# Patient Record
Sex: MALE | Race: WHITE | Employment: OTHER | URBAN - METROPOLITAN AREA
[De-identification: names, ages, dates, MRNs, and addresses within clinical notes are randomized per-mention and may not be internally consistent; named-entity substitution may affect disease eponyms.]

---

## 2020-06-09 ENCOUNTER — HOSPITAL ENCOUNTER (OUTPATIENT)
Dept: PREADMISSION TESTING | Age: 70
Discharge: HOME | End: 2020-06-09
Payer: MEDICARE

## 2020-06-09 PROCEDURE — 87635 SARS-COV-2 COVID-19 AMP PRB: CPT

## 2020-06-10 LAB — SARS-COV-2, COV2NT: NOT DETECTED

## 2020-06-11 RX ORDER — SODIUM CHLORIDE 0.9 % (FLUSH) 0.9 %
5-40 SYRINGE (ML) INJECTION AS NEEDED
Status: CANCELLED | OUTPATIENT
Start: 2020-06-11

## 2020-06-11 RX ORDER — EPINEPHRINE 0.1 MG/ML
1 INJECTION INTRACARDIAC; INTRAVENOUS
Status: CANCELLED | OUTPATIENT
Start: 2020-06-11 | End: 2020-06-12

## 2020-06-11 RX ORDER — ATROPINE SULFATE 0.1 MG/ML
0.5 INJECTION INTRAVENOUS
Status: CANCELLED | OUTPATIENT
Start: 2020-06-11 | End: 2020-06-12

## 2020-06-11 RX ORDER — DIPHENHYDRAMINE HYDROCHLORIDE 50 MG/ML
50 INJECTION, SOLUTION INTRAMUSCULAR; INTRAVENOUS ONCE
Status: CANCELLED | OUTPATIENT
Start: 2020-06-11 | End: 2020-06-11

## 2020-06-11 RX ORDER — SODIUM CHLORIDE 0.9 % (FLUSH) 0.9 %
5-40 SYRINGE (ML) INJECTION EVERY 8 HOURS
Status: CANCELLED | OUTPATIENT
Start: 2020-06-11

## 2020-06-11 RX ORDER — DEXTROMETHORPHAN/PSEUDOEPHED 2.5-7.5/.8
1.2 DROPS ORAL
Status: CANCELLED | OUTPATIENT
Start: 2020-06-11

## 2020-06-12 NOTE — H&P
Assessment/Plan  # Detail Type Description    1. Assessment Personal history of colonic polyps (Z86.010). Patient Plan 69 yr old male patient of Dr. Liz Herrera seen for hx of polyps. Last colonoscopy was done 9/5/14 done by Dr. Yasmany Werner. Normal cecum, a single small sessile sigmoid polyp. Path revealed adenomatous polyp. N fx hx of colon cancer. he hs one bm daily. he weighs only 113 lbs. so we need to use the pediatric colonoscope. he used to take narcotic medications long time ago but no longer. so he may have a problem with sedation? ? as the last time he was done under [facility]. he has HTN . Had appendectomy and back surgery. he had an unbalanced tabetic gait. I explained the procedure of colonoscopy, where there is a chance of taking a biopsy, injecting, dilating or removing polyps. I explained also the risk and benefits involved which include but not limited to reaction to medication/ sedation, bleeding, perforation, where there may be a need for surgery. There is also a chance of missing a lesion or a polyp if the bowel prep is inadequate or the colon is unusually tortuous and difficult. I answered his questions. He was agreeable to proceed with the test. I gave him the Suprep to clean the bowels. He may have to take extra laxatives the days before to assure that the bowel prep is as perfect as possible. This 69year old male presents for Hx polyp/colon cancer. History of Present Illness:  1. Hx polyp/colon cancer   Prior screening:  colonoscopy. Denies risk factors. Pertinent negatives include abdominal pain, change in bowel habits, change in stool caliber, constipation, decreased appetite, diarrhea, melena, nausea, rectal bleeding, vomiting, weight gain and weight loss. Additional information: No family history of colon cancer, No family history of Crohn's/colitis and NSAID/ASA use. PROBLEM LIST:   Problem List reviewed.    Problem Description Onset Date Chronic Clinical Status Notes   On examination - BP reading very high 2012 Y     Generalized anxiety disorder 2012 Y     Pure hypercholesterolemia 2012 Y     Tobacco user 2012 Y               PAST MEDICAL/SURGICAL HISTORY   (Detailed)    Disease/disorder Onset Date Management Date Comments     Back surgery 2012      Appendectomy 2002    Cerebrovascular accident       Hyperlipidemia       Hypertension       Anxiety             Family History  (Detailed)  Relationship Family Member Name  Age at Death Condition Onset Age Cause of Death   Father  N  aneurysm  N   Mother  N  Heart disease  N   Mother  N  Diabetes mellitus  N   Mother  N  Hypertension  N         Social History:  (Detailed)  Tobacco use reviewed. The patient is right-handed. Preferred language is [place]. EDUCATION/EMPLOYMENT/OCCUPATION  Employment History Status Retired Restrictions   [employer] Nurse retired 1999      MARITAL STATUS/FAMILY/SOCIAL SUPPORT  Marital status: Single   Tobacco use status: Cigarette smoker. Smoking status: Current every day smoker. TOBACCO SCREENING:  Patient has used tobacco.     SMOKING STATUS  Type Smoking Status Usage Per Day Years Used Pack Years Total Pack Years   Cigarette Current every day smoker       Smokeless Current every day smoker         TOBACCO CESSATION INFORMATION  Date Counseled By Order Status Description Code Tobacco Cessation Information   2012 Rolly Persaud Tobacco cessation counseling completed   Tobacco cessation counseling   10/11/2012 Rolly Persaud Tobacco cessation counseling completed   Tobacco cessation counseling   10/17/2013 Adis Nieto Tobacco cessation counseling completed   Tobacco cessation counseling     ALCOHOL  There is a history of alcohol use. Type: Beer. 2 beers consumed daily. Last alcoholic drink was last night. CAFFEINE  The patient uses caffeine: coffee and soda. - 1 cup a day. LIFESTYLE  Exercises 2-3 times a week.   Exercises 3 hours per week.    Church/SPIRITUAL  Patient agrees to transfusion. Medications (active prior to today)  Medication Name Sig Description Start Date Stop Date Refilled Rx Elsewhere   polyethylene glycol 3350 17 gram/dose oral powder Take 255 grams per oral intake per colonoscopy prep sheet 08/28/2014 06/02/2020  N   cyanocobalamin (vit B-12) 1,000 mcg/mL injection solution 1 ml injected IM weekly for 4 one month then one time monthly therafter. 11/01/2016 06/02/2020  N   Crestor 10 mg tablet take 1 tablet by oral route  every day 11/07/2019 11/07/2019 N   lisinopril 20 mg-hydrochlorothiazide 12.5 mg tablet take 1 tablet by oral route  every day 11/07/2019 11/07/2019 N   amlodipine 10 mg tablet take 1 tablet by oral route  every day 11/07/2019 11/07/2019 N   ergocalciferol (vitamin D2) 50,000 unit capsule take 1 capsule by oral route  every week 11/07/2019 06/02/2020 11/07/2019 N   aspirin 81 mg tablet,delayed release take 1 tablet by oral route  every day 05/06/2020   N     Patient Status   Completed with information received for patient in a summary of care record. Medication Reconciliation  Medications reconciled today. Medication Reviewed  Adherence Medication Name Sig Desc Elsewhere Status   taking as directed polyethylene glycol 3350 17 gram/dose oral powder Take 255 grams per oral intake per colonoscopy prep sheet N Verified   taking as directed cyanocobalamin (vit B-12) 1,000 mcg/mL injection solution 1 ml injected IM weekly for 4 one month then one time monthly therafter.  N Verified   taking as directed Crestor 10 mg tablet take 1 tablet by oral route  every day N Verified   taking as directed lisinopril 20 mg-hydrochlorothiazide 12.5 mg tablet take 1 tablet by oral route  every day N Verified   taking as directed amlodipine 10 mg tablet take 1 tablet by oral route  every day N Verified   taking as directed ergocalciferol (vitamin D2) 50,000 unit capsule take 1 capsule by oral route  every week N Verified   taking as directed aspirin 81 mg tablet,delayed release take 1 tablet by oral route  every day N Verified     Medications (Added, Continued or Stopped today)  Start Date Medication Directions PRN Status PRN Reason Instruction Stop Date   11/07/2019 amlodipine 10 mg tablet take 1 tablet by oral route  every day N      05/06/2020 aspirin 81 mg tablet,delayed release take 1 tablet by oral route  every day N      11/07/2019 Crestor 10 mg tablet take 1 tablet by oral route  every day N      11/01/2016 cyanocobalamin (vit B-12) 1,000 mcg/mL injection solution 1 ml injected IM weekly for 4 one month then one time monthly therafter. N   06/02/2020 11/07/2019 ergocalciferol (vitamin D2) 50,000 unit capsule take 1 capsule by oral route  every week N   06/02/2020 11/07/2019 lisinopril 20 mg-hydrochlorothiazide 12.5 mg tablet take 1 tablet by oral route  every day N      08/28/2014 polyethylene glycol 3350 17 gram/dose oral powder Take 255 grams per oral intake per colonoscopy prep sheet N   06/02/2020 06/02/2020 Suprep Bowel Prep Kit 17.5 gram-3.13 gram-1.6 gram oral solution take first half the evening before the procedure and the second half the morning of the procedure N        Allergies:  Ingredient Reaction (Severity) Medication Name Comment   NO KNOWN ALLERGIES            Review of System  System Neg/Pos Details   Constitutional Negative Chills, Fever, Malaise, Weight gain and Weight loss. ENMT Negative Ear infections, Nasal congestion, Sinus Infection and Sore throat. Eyes Negative Double vision and Eye pain. Respiratory Negative Asthma, Chronic cough, Dyspnea, Pleuritic pain and Wheezing. Cardio Negative Chest pain, Edema and Irregular heartbeat/palpitations. GI Negative Abdominal pain, Change in bowel habits, Change in stool caliber, Constipation, Decreased appetite, Diarrhea, Dysphagia, Heartburn, Hematemesis, Hematochezia, Melena, Nausea, Rectal bleeding, Reflux and Vomiting.     Negative Dysuria, Hematuria, Urinary frequency, Urinary incontinence and Urinary retention. Endocrine Negative Cold intolerance, Gynecomastia, Heat intolerance and Increased thirst.   Neuro Negative Dizziness, Headache, Numbness, Tremors and Vertigo. Psych Negative Anxiety, Depression and Increased stress. Integumentary Negative Hives, Pruritus and Rash. MS Negative Back pain, Joint pain and Myalgia. Hema/Lymph Negative Easy bleeding, Easy bruising and Lymphadenopathy. Allergic/Immuno Negative Chemicals in work place, Contact allergy, Food allergies, Immunosuppression and Seasonal allergies. Reproductive Negative Penile discharge and Sexual dysfunction. Vital Signs   Height  Time ft in cm Last Measured Height Position   2:38 PM 5.0 4.00 162.56 08/28/2014 Standing     06/15/20 1312 -- 75 118/63 -- -- -- 0Abnormal  97 % -- -- -- --   06/15/20 1310 -- 77 118/63 81 -- -- 18 97 % Room air -- -- --   06/15/20 1309 -- 76 126/68 -- -- -- -- 97 % -- -- -- --   06/15/20 1209 97.4 °F (36.3 °C) 90 117/76 90 -- -- 16 99 % Room air -- -- 51 kg (112 lb 6.4 oz)         PHYSICAL EXAM:  Exam Findings Details   Constitutional * Nourishment - thin. Constitutional Normal No acute distress. Well developed. Eyes Normal General - Right: Normal, Left: Normal. Conjunctiva - Right: Normal, Left: Normal. Sclera - Right: Normal, Left: Normal. Cornea - Right: Normal, Left: Normal. Pupil - Right: Normal, Left: Normal.   Nose/Mouth/Throat Normal Lips/teeth/gums - Normal. Tongue - Normal. Buccal mucosa - Normal. Palate & uvula - Normal.   Neck Exam Normal Inspection - Normal. Palpation - Normal. Thyroid gland - Normal. Cervical lymph nodes - Normal.   Respiratory Normal Inspection - Normal. Auscultation - Normal. Percussion - Normal. Cough - Absent. Effort - Normal.   Cardiovascular Normal Heart rate - Regular rate. Heart sounds - Normal S1, Normal S2. Murmurs - None. Extremities - No edema.    Abdomen Normal Inspection - Normal. Appliance(s) - None. Abdominal muscles - Normal. Auscultation - Normal. Percussion - Normal. Anterior palpation - Normal, No guarding, No rebound. CVA tenderness - None. Umbilicus - Normal. Abdominal reflexes - Normal. No abdominal tenderness. No hepatic enlargement. No splenic enlargement. No hernia. No Ascites. No palpable mass. Priest's sign - Negative. Skin Normal Inspection - Normal.   Musculoskeletal Normal Hands - Left: Normal, Right: Normal.   Extremity Normal No cyanosis. No edema. Clubbing - Absent. Psychiatric Normal Orientation - Oriented to time, place, person & situation. Not anxious. Appropriate mood and affect. Behavior appropriate for age. Sufficient language. No memory loss. Neurological * Balance & gait - tabetic. Neurological Normal Level of consciousness - Normal. Orientation - Normal. Memory - Normal. Motor - Normal. Coordination - Normal. Fine motor skills - Normal. DTRs - Normal. Hand dominance - Right-handed.        No change in H&P

## 2020-06-15 ENCOUNTER — HOSPITAL ENCOUNTER (OUTPATIENT)
Age: 70
Setting detail: OUTPATIENT SURGERY
Discharge: HOME | End: 2020-06-15
Attending: INTERNAL MEDICINE | Admitting: INTERNAL MEDICINE
Payer: MEDICARE

## 2020-06-15 VITALS
OXYGEN SATURATION: 95 % | DIASTOLIC BLOOD PRESSURE: 74 MMHG | SYSTOLIC BLOOD PRESSURE: 112 MMHG | TEMPERATURE: 97.3 F | HEIGHT: 64 IN | WEIGHT: 112.4 LBS | HEART RATE: 70 BPM | RESPIRATION RATE: 16 BRPM | BODY MASS INDEX: 19.19 KG/M2

## 2020-06-15 PROCEDURE — 74011250636 HC RX REV CODE- 250/636: Performed by: INTERNAL MEDICINE

## 2020-06-15 PROCEDURE — G0500 MOD SEDAT ENDO SERVICE >5YRS: HCPCS | Performed by: INTERNAL MEDICINE

## 2020-06-15 PROCEDURE — 76040000008: Performed by: INTERNAL MEDICINE

## 2020-06-15 PROCEDURE — 77030040361 HC SLV COMPR DVT MDII -B: Performed by: INTERNAL MEDICINE

## 2020-06-15 RX ORDER — NALOXONE HYDROCHLORIDE 0.4 MG/ML
0.4 INJECTION, SOLUTION INTRAMUSCULAR; INTRAVENOUS; SUBCUTANEOUS
Status: DISCONTINUED | OUTPATIENT
Start: 2020-06-15 | End: 2020-06-15 | Stop reason: HOSPADM

## 2020-06-15 RX ORDER — FLUMAZENIL 0.1 MG/ML
0.2 INJECTION INTRAVENOUS
Status: DISCONTINUED | OUTPATIENT
Start: 2020-06-15 | End: 2020-06-15 | Stop reason: HOSPADM

## 2020-06-15 RX ORDER — FENTANYL CITRATE 50 UG/ML
100 INJECTION, SOLUTION INTRAMUSCULAR; INTRAVENOUS
Status: DISCONTINUED | OUTPATIENT
Start: 2020-06-15 | End: 2020-06-15 | Stop reason: HOSPADM

## 2020-06-15 RX ORDER — MIDAZOLAM HYDROCHLORIDE 1 MG/ML
.25-5 INJECTION, SOLUTION INTRAMUSCULAR; INTRAVENOUS
Status: DISCONTINUED | OUTPATIENT
Start: 2020-06-15 | End: 2020-06-15 | Stop reason: HOSPADM

## 2020-06-15 RX ORDER — SODIUM CHLORIDE 9 MG/ML
1000 INJECTION, SOLUTION INTRAVENOUS CONTINUOUS
Status: DISCONTINUED | OUTPATIENT
Start: 2020-06-15 | End: 2020-06-15 | Stop reason: HOSPADM

## 2020-06-15 RX ADMIN — SODIUM CHLORIDE 1000 ML: 900 INJECTION, SOLUTION INTRAVENOUS at 12:21

## 2020-06-15 NOTE — DISCHARGE INSTRUCTIONS
Estevan Ellison  [id number]  1950    COLON DISCHARGE INSTRUCTIONS    Discomfort:  Redness at IV site- apply warm compress to area; if redness or soreness persist- contact your physician  There may be a slight amount of blood passed from the rectum  Gaseous discomfort- walking, belching will help relieve any discomfort  You may not operate a vehicle til the next day. You may not engage in an occupation involving machinery or appliances til the next day. You may not drink alcoholic beverages til the next day. DIET:   High fiber diet. ACTIVITY:  You may not  resume your normal daily activities til the next day. it is recommended that you spend the remainder of the day resting -  avoid any strenuous activity. CALL M.D.  IF ANY SIGN OF:   Increasing pain, nausea, vomiting  Abdominal distension (swelling)  New increased bleeding (oral or rectal)  Fever (chills)  Pain in chest area  Bloody discharge from nose or mouth  Shortness of breath    You may  take any Advil, Aspirin, Ibuprofen, Motrin, Aleve, or Goodys but preferably Tylenol as needed for pain. Post procedure diagnosis:  TORTUOUS SIGMOID; Hemorrhoids    Follow-up Instructions: Your follow up colonoscopy will be in 10 years. We will notify you the results of your biopsy by letter within 2 weeks. Martin Falcon MD  Harika 15, 2020       DISCHARGE SUMMARY from Nurse    PATIENT INSTRUCTIONS:    After general anesthesia or intravenous sedation, for 24 hours or while taking prescription Narcotics:  · Limit your activities  · Do not drive and operate hazardous machinery  · Do not make important personal or business decisions  · Do  not drink alcoholic beverages  · If you have not urinated within 8 hours after discharge, please contact your surgeon on call.     Report the following to your surgeon:  · Excessive pain, swelling, redness or odor of or around the surgical area  · Temperature over 100.5  · Nausea and vomiting lasting longer than 4 hours or if unable to take medications  · Any signs of decreased circulation or nerve impairment to extremity: change in color, persistent  numbness, tingling, coldness or increase pain  · Any questions    What to do at Home:  Recommended activity: as above,     If you experience any of the following symptoms as above, please follow up with Dr. Ganga Paulino. *  Please give a list of your current medications to your Primary Care Provider. *  Please update this list whenever your medications are discontinued, doses are      changed, or new medications (including over-the-counter products) are added. *  Please carry medication information at all times in case of emergency situations. These are general instructions for a healthy lifestyle:    No smoking/ No tobacco products/ Avoid exposure to second hand smoke  Surgeon General's Warning:  Quitting smoking now greatly reduces serious risk to your health. Obesity, smoking, and sedentary lifestyle greatly increases your risk for illness    A healthy diet, regular physical exercise & weight monitoring are important for maintaining a healthy lifestyle    You may be retaining fluid if you have a history of heart failure or if you experience any of the following symptoms:  Weight gain of 3 pounds or more overnight or 5 pounds in a week, increased swelling in our hands or feet or shortness of breath while lying flat in bed. Please call your doctor as soon as you notice any of these symptoms; do not wait until your next office visit. The discharge information has been reviewed with the patient. The patient verbalized understanding. Discharge medications reviewed with the patient and appropriate educational materials and side effects teaching were provided.   ___________________________________________________________________________________________________________________________________    Patient armband removed and shredded

## 2020-06-15 NOTE — PROCEDURES
[facility]  Colonoscopy Procedure Report  _______________________________________________________  Patient: Mervin Murillo                                        Attending Physician: Terence Agee MD    Patient ID: [id number]                                    Referring Physician: Jay Gowers, MD    Exam Date: 6/15/2020      Introduction: A  69 y.o. male patient, presents for inpatient Colonoscopy    Indications: Screening for colon cancer average risk and asymptomatic. Last colonoscopy done by Dr Vineet Salmeron on September 5, 2014, very difficult sigmoid a small sigmoid adenoma polyp removed      Consent: The benefits, risks, and alternatives to the procedure were discussed and informed consent was obtained from the patient. Preparation: EKG, pulse, pulse oximetry and blood pressure were monitored throughout the procedure. ASA Classification: Class II- . The heart is an S1-S2 and regular heart rate and rhythm. Lungs are clear to auscultation and percussion. Abdomen is soft, nondistended, and nontender. Mental Status: awake, alert, and oriented to person, place, and time    Medications:  · Fentanyl 100 mcg IV before procedure. · Versed 7 mg IV throughout the procedure. Rectal Exam: slightly enlarged prostate. Normal Rectal Exam. No Blood. Pathology Specimens:  0    Procedure: The pediatric colonoscope was passed with difficulty through the anus under direct visualization and advanced to the cecum and 5 cm inside the terminal ileum. The patient required positioning on the back to aid in the passage of the scope. The scope was withdrawn and the mucosa was carefully examined. The quality of the preparation was good. The views were very good. The patient's toleration of the procedure was god. The exam was done twice to the cecum. Retroflexion done in the ascending colon Total time is 29 minutes and withdrawal time is 16 minutes.     Findings:    Rectum:   Small internal hemorrhoids. Sigmoid:   Very difficult tortuous and fixed sigmoid colon from adhesions. Descending Colon:   Normal   Transverse Colon:   Normal   Ascending Colon:   Normal   Cecum:   Normal   Terminal Ileum:   Normal      Unplanned Events: There were no unplanned events. Estimated Blood Loss: None  Impressions: slightly enlarged prostate. Small internal hemorrhoids. Very difficult tortuous and fixed sigmoid colon from adhesions. Normal Mucosa. No blood, diverticula, polyps or AVM found. Complications: None; patient tolerated the procedure well. Recommendations:  · Discharge home when standard parameters are met. · Resume a high fiber diet. · Resume own medications. Avoid all NSAID's for  · Colonoscopy recommendation in 10 years.   · Take Miralax and/ or Colace 100 mg on regular basis if constipated    Procedure Codes:    · COLONOSCOPY [SIS4880 (Type: Custom)]    Endoscope Information:  Model Number(s)    [id number]   Assistant: None  Signed By: Tracy Fortune MD Date: 6/15/2020

## 2020-11-16 ENCOUNTER — HOSPITAL ENCOUNTER (OUTPATIENT)
Dept: PREADMISSION TESTING | Age: 70
Discharge: HOME | End: 2020-11-16
Payer: MEDICARE

## 2020-11-16 LAB
ANION GAP SERPL CALC-SCNC: 7 MMOL/L (ref 3–18)
BUN SERPL-MCNC: 17 MG/DL (ref 7–18)
BUN/CREAT SERPL: 21 (ref 12–20)
CALCIUM SERPL-MCNC: 9.6 MG/DL (ref 8.5–10.1)
CHLORIDE SERPL-SCNC: 103 MMOL/L (ref 100–111)
CO2 SERPL-SCNC: 27 MMOL/L (ref 21–32)
CREAT SERPL-MCNC: 0.82 MG/DL (ref 0.6–1.3)
GLUCOSE SERPL-MCNC: 91 MG/DL (ref 74–99)
HCT VFR BLD AUTO: 43.5 % (ref 36–48)
HGB BLD-MCNC: 14.8 G/DL (ref 13–16)
POTASSIUM SERPL-SCNC: 4.2 MMOL/L (ref 3.5–5.5)
SODIUM SERPL-SCNC: 137 MMOL/L (ref 136–145)

## 2020-11-16 PROCEDURE — 85018 HEMOGLOBIN: CPT

## 2020-11-16 PROCEDURE — 80048 BASIC METABOLIC PNL TOTAL CA: CPT

## 2020-11-16 PROCEDURE — 93005 ELECTROCARDIOGRAM TRACING: CPT

## 2020-11-16 PROCEDURE — 36415 COLL VENOUS BLD VENIPUNCTURE: CPT

## 2020-11-17 LAB
ATRIAL RATE: 82 BPM
CALCULATED P AXIS, ECG09: 67 DEGREES
CALCULATED R AXIS, ECG10: 57 DEGREES
CALCULATED T AXIS, ECG11: 59 DEGREES
DIAGNOSIS, 93000: NORMAL
P-R INTERVAL, ECG05: 142 MS
Q-T INTERVAL, ECG07: 352 MS
QRS DURATION, ECG06: 100 MS
QTC CALCULATION (BEZET), ECG08: 411 MS
VENTRICULAR RATE, ECG03: 82 BPM

## 2020-12-03 ENCOUNTER — HOSPITAL ENCOUNTER (OUTPATIENT)
Dept: PREADMISSION TESTING | Age: 70
Discharge: HOME | End: 2020-12-03
Payer: MEDICARE

## 2020-12-03 PROCEDURE — 87635 SARS-COV-2 COVID-19 AMP PRB: CPT

## 2020-12-04 LAB — SARS-COV-2, COV2NT: NOT DETECTED

## 2020-12-08 ENCOUNTER — ANESTHESIA EVENT (OUTPATIENT)
Dept: SURGERY | Age: 70
End: 2020-12-08
Payer: MEDICARE

## 2020-12-08 NOTE — H&P
Assessment/Plan  # Detail Type Description    1. Assessment Inguinal hernia (K40.90). Patient Plan Discussed Dx and options. He would like to get this fixed now. Rec robotic repair. I have discussed the risks benefits and alternatives of the procedure to the patient including bleeding, infection, use of mesh, myofascial release, chronic post op pain, reason and side effects of nerve resections, recurrence and loss of testicle. They understand and wish to proceed. 2. 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 3. Assessment Essential (primary) hypertension (I10). 4. Assessment Stroke (I63.9). 5. Assessment Generalized anxiety disorder (F41.1). 6. Assessment Peripheral vascular disease, unspecified (I73.9). 7. Assessment Tobacco user (Z72.0). This 69year old male presents for Hernia. History of Present Illness:  1. Hernia   Duration: 6 Months. Severity: 1. The problem is worse. and RLQ  There is radiation to groin. The patient describes it as dull. Pertinent negatives include abdominal distention, anorexia, back pain, bloating, blood in stool, constipation, cough, diaphoresis, diarrhea, dizziness, dyspnea, epigastric pain, eructation, fatigue, fever, flank pain, flatulence, heartburn, hematuria, jaundice, lightheadedness, menstruation, milk/dairy intolerance, myalgia, nausea, post prandial fullness, reflux, scrotal swelling, vomiting, weight gain and weight loss. Additional information: [facility] and kenyatta Wu PROBLEM LIST:   Problem List reviewed. 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 Last detailed document date:06/02/2020. Family History:  Reviewed, no changes. Last detailed document date:06/02/2020. Social History:  Reviewed, no changes. Last detailed document date: 06/02/2020. Medications (active prior to today)  Medication Name Sig Description Start Date Stop Date Refilled Rx Elsewhere   Crestor 10 mg tablet take 1 tablet by oral route  every day 11/07/2019 11/07/2019 N   lisinopril 20 mg-hydrochlorothiazide 12.5 mg tablet take 1 tablet by oral route  every day 11/07/2019 11/07/2019 N   amlodipine 10 mg tablet take 1 tablet by oral route  every day 11/07/2019 11/07/2019 N   aspirin 81 mg tablet,delayed release take 1 tablet by oral route  every day 05/06/2020   N   GaviLyte-N 420 gram oral solution take as prescribed by physician 06/04/2020   N     Medication Reconciliation  Medications reconciled today. Medication Reviewed  Adherence Medication Name Sig Desc Elsewhere Status   taking as directed Crestor 10 mg tablet take 1 tablet by oral route  every day N Verified   taking as directed lisinopril 20 mg-hydrochlorothiazide 12.5 mg tablet take 1 tablet by oral route  every day N Verified   taking as directed amlodipine 10 mg tablet take 1 tablet by oral route  every day N Verified   taking as directed aspirin 81 mg tablet,delayed release take 1 tablet by oral route  every day N Verified   taking as directed GaviLyte-N 420 gram oral solution take as prescribed by physician N Verified     Allergies:  Ingredient Reaction (Severity) Medication Name Comment   NO KNOWN ALLERGIES      Reviewed, updated. Review of Systems  System Neg/Pos Details   Constitutional Negative Fatigue, Fever, Weight gain and Weight loss. Respiratory Negative Cough and Dyspnea. GI Negative Abdominal distention, Anorexia, Bloating, Blood in stool, Constipation, Diarrhea, Epigastric pain, Eructation, Flatulence, Heartburn, Jaundice, Milk/diary intolerance, Nausea, Post prandial fullness, Reflux and Vomiting.     Negative Back pain, Flank pain, Hematuria, Menstruation and Scrotal swelling. Endocrine Negative Diaphoresis. Neuro Negative Dizziness and Lightheadedness. MS Negative Myalgia. Physical Exam:  Exam Findings Details   Constitutional Normal Well developed. Eyes Normal Conjunctiva - Right: Normal, Left: Normal. Pupil - Right: Normal.   Ears Normal Inspection - Right: Normal, Left: Normal. Hearing - Right: Normal, Left: Normal.   Nose/Mouth/Throat Normal External nose - Normal. Lips/teeth/gums - Normal. Oropharynx - Normal.   Neck Exam Normal Inspection - Normal. Palpation - Normal. Thyroid gland - Normal.   Lymph Detail Normal No cervical, supraclavicular, or axillary adenopathy. Respiratory Normal Inspection - Normal. Auscultation - Normal. Effort - Normal.   Cardiovascular Normal Regular rate and rhythm. No murmurs, gallops, or rubs. Vascular Normal Capillary refill - Less than 2 seconds. Abdomen Normal Inspection - Normal. No abdominal tenderness. No hepatic enlargement. No spleen enlargement. No hernia. Genitourinary * Hernia - Hernia Type: inguinal. Location: right, Reducible. Genitourinary Normal Scrotum - Normal. Testes - Normal.   Skin Normal Inspection - Normal.   Musculoskeletal Normal Visual overview of all four extremities is normal.   Extremity Normal No edema. Neurological Normal Memory - Normal. Cranial nerves - Cranial nerves I grossly intact, Cranial nerves II through XII grossly intact. Psychiatric Normal Orientation - Oriented to time, place, person & situation. Appropriate mood and affect. Normal insight. Normal judgment.          Immunizations Entered by History    Date Immunization   8/31/2019 12:00:00 AM Shingrix   8/31/2019 12:00:00 AM influenza virus vaccine, unspecified formulation   8/29/2018 12:00:00 AM Flu (3 yrs or older)   10/20/2016 12:00:00 AM Pneumococcal conjugate PCV 13       Medications (added, continued, or stopped this visit):  Start Date Medication Directions PRN Status PRN Reason Instruction Stop Date   11/07/2019 amlodipine 10 mg tablet take 1 tablet by oral route  every day N      05/06/2020 aspirin 81 mg tablet,delayed release take 1 tablet by oral route  every day N      11/07/2019 Crestor 10 mg tablet take 1 tablet by oral route  every day N      06/04/2020 GaviLyte-N 420 gram oral solution take as prescribed by physician N      11/07/2019 lisinopril 20 mg-hydrochlorothiazide 12.5 mg tablet take 1 tablet by oral route  every day N            Active Patient Care Team Members    Name Contact Agency Type Support Role Relationship Active Date Inactive Date Specialty   Satish English   Patient provider PCP   Internal Medicine   [facility]   primary care provider    Internal Med   [facility]   encounter provider    Gastroenterology         Provider: Jarrett Dukes. MD Andrew 11/03/2020 02:15 PM  Document generated by:  Jarrett Morales 11/03/2020 03:01 PM                             Electronically signed by Jarrett Dukes.  Andrew HURTADO on 11/03/2020 03:03 PM

## 2020-12-09 ENCOUNTER — ANESTHESIA (OUTPATIENT)
Dept: SURGERY | Age: 70
End: 2020-12-09
Payer: MEDICARE

## 2020-12-09 ENCOUNTER — HOSPITAL ENCOUNTER (OUTPATIENT)
Age: 70
Setting detail: OUTPATIENT SURGERY
Discharge: HOME | End: 2020-12-09
Attending: SURGERY | Admitting: SURGERY
Payer: MEDICARE

## 2020-12-09 VITALS
WEIGHT: 117 LBS | DIASTOLIC BLOOD PRESSURE: 64 MMHG | BODY MASS INDEX: 19.97 KG/M2 | HEIGHT: 64 IN | HEART RATE: 90 BPM | OXYGEN SATURATION: 99 % | SYSTOLIC BLOOD PRESSURE: 116 MMHG | TEMPERATURE: 98.9 F | RESPIRATION RATE: 16 BRPM

## 2020-12-09 DIAGNOSIS — K40.90 INGUINAL HERNIA WITHOUT OBSTRUCTION OR GANGRENE, RECURRENCE NOT SPECIFIED, UNSPECIFIED LATERALITY: Primary | ICD-10-CM

## 2020-12-09 PROCEDURE — 76210000021 HC REC RM PH II 0.5 TO 1 HR: Performed by: SURGERY

## 2020-12-09 PROCEDURE — 2709999900 HC NON-CHARGEABLE SUPPLY: Performed by: SURGERY

## 2020-12-09 PROCEDURE — 77030008683 HC TU ET CUF COVD -A: Performed by: SPECIALIST

## 2020-12-09 PROCEDURE — 74011250636 HC RX REV CODE- 250/636: Performed by: SPECIALIST

## 2020-12-09 PROCEDURE — 77030020782 HC GWN BAIR PAWS FLX 3M -B: Performed by: SURGERY

## 2020-12-09 PROCEDURE — 76060000033 HC ANESTHESIA 1 TO 1.5 HR: Performed by: SURGERY

## 2020-12-09 PROCEDURE — 76210000016 HC OR PH I REC 1 TO 1.5 HR: Performed by: SURGERY

## 2020-12-09 PROCEDURE — 77030040361 HC SLV COMPR DVT MDII -B: Performed by: SURGERY

## 2020-12-09 PROCEDURE — 77030008477 HC STYL SATN SLP COVD -A: Performed by: SPECIALIST

## 2020-12-09 PROCEDURE — 77030016151 HC PROTCTR LNS DFOG COVD -B: Performed by: SURGERY

## 2020-12-09 PROCEDURE — C1781 MESH (IMPLANTABLE): HCPCS | Performed by: SURGERY

## 2020-12-09 PROCEDURE — 77030003578 HC NDL INSUF VERES AMR -B: Performed by: SURGERY

## 2020-12-09 PROCEDURE — 77030006643: Performed by: SPECIALIST

## 2020-12-09 PROCEDURE — 77030002933 HC SUT MCRYL J&J -A: Performed by: SURGERY

## 2020-12-09 PROCEDURE — 77030010507 HC ADH SKN DERMBND J&J -B: Performed by: SURGERY

## 2020-12-09 PROCEDURE — 74011250636 HC RX REV CODE- 250/636: Performed by: SURGERY

## 2020-12-09 PROCEDURE — 74011000250 HC RX REV CODE- 250: Performed by: SPECIALIST

## 2020-12-09 PROCEDURE — 77030009403 HC ELECTRD ENDO MEGA -B: Performed by: SURGERY

## 2020-12-09 PROCEDURE — 77030031492 HC PRT ACC BLNT AIRSEAL CNMD -B: Performed by: SURGERY

## 2020-12-09 PROCEDURE — 74011000250 HC RX REV CODE- 250: Performed by: SURGERY

## 2020-12-09 PROCEDURE — 77030035277 HC OBTRTR BLDELSS DISP INTU -B: Performed by: SURGERY

## 2020-12-09 PROCEDURE — 76010000934 HC OR TIME 1 TO 1.5HR INTENSV - TIER 2: Performed by: SURGERY

## 2020-12-09 PROCEDURE — 74011000258 HC RX REV CODE- 258: Performed by: SURGERY

## 2020-12-09 PROCEDURE — 77030022704 HC SUT VLOC COVD -B: Performed by: SURGERY

## 2020-12-09 PROCEDURE — 77030020703 HC SEAL CANN DISP INTU -B: Performed by: SURGERY

## 2020-12-09 PROCEDURE — 74011250637 HC RX REV CODE- 250/637: Performed by: SPECIALIST

## 2020-12-09 DEVICE — LAPAROSCOPIC SELF-FIXATING MESH POLYESTER WITH POLYLACTIC ACID GRIPS AND COLLAGEN FILM
Type: IMPLANTABLE DEVICE | Site: GROIN | Status: FUNCTIONAL
Brand: PROGRIP

## 2020-12-09 RX ORDER — OXYCODONE AND ACETAMINOPHEN 5; 325 MG/1; MG/1
1 TABLET ORAL
Qty: 10 TAB | Refills: 0 | Status: SHIPPED | OUTPATIENT
Start: 2020-12-09 | End: 2020-12-14

## 2020-12-09 RX ORDER — NEOSTIGMINE METHYLSULFATE 1 MG/ML
INJECTION, SOLUTION INTRAVENOUS AS NEEDED
Status: DISCONTINUED | OUTPATIENT
Start: 2020-12-09 | End: 2020-12-09 | Stop reason: HOSPADM

## 2020-12-09 RX ORDER — DEXTROSE MONOHYDRATE 100 MG/ML
125-250 INJECTION, SOLUTION INTRAVENOUS AS NEEDED
Status: DISCONTINUED | OUTPATIENT
Start: 2020-12-09 | End: 2020-12-09 | Stop reason: HOSPADM

## 2020-12-09 RX ORDER — DEXAMETHASONE SODIUM PHOSPHATE 4 MG/ML
4 INJECTION, SOLUTION INTRA-ARTICULAR; INTRALESIONAL; INTRAMUSCULAR; INTRAVENOUS; SOFT TISSUE ONCE
Status: COMPLETED | OUTPATIENT
Start: 2020-12-09 | End: 2020-12-09

## 2020-12-09 RX ORDER — KETOROLAC TROMETHAMINE 30 MG/ML
15 INJECTION, SOLUTION INTRAMUSCULAR; INTRAVENOUS
Status: DISCONTINUED | OUTPATIENT
Start: 2020-12-09 | End: 2020-12-09 | Stop reason: HOSPADM

## 2020-12-09 RX ORDER — FENTANYL CITRATE 50 UG/ML
50 INJECTION, SOLUTION INTRAMUSCULAR; INTRAVENOUS
Status: DISCONTINUED | OUTPATIENT
Start: 2020-12-09 | End: 2020-12-09 | Stop reason: HOSPADM

## 2020-12-09 RX ORDER — KETAMINE HCL IN 0.9 % NACL 50 MG/5 ML
SYRINGE (ML) INTRAVENOUS AS NEEDED
Status: DISCONTINUED | OUTPATIENT
Start: 2020-12-09 | End: 2020-12-09 | Stop reason: HOSPADM

## 2020-12-09 RX ORDER — OXYCODONE AND ACETAMINOPHEN 5; 325 MG/1; MG/1
1 TABLET ORAL AS NEEDED
Status: DISCONTINUED | OUTPATIENT
Start: 2020-12-09 | End: 2020-12-09 | Stop reason: HOSPADM

## 2020-12-09 RX ORDER — FENTANYL CITRATE 50 UG/ML
25 INJECTION, SOLUTION INTRAMUSCULAR; INTRAVENOUS AS NEEDED
Status: DISCONTINUED | OUTPATIENT
Start: 2020-12-09 | End: 2020-12-09 | Stop reason: HOSPADM

## 2020-12-09 RX ORDER — SODIUM CHLORIDE, SODIUM LACTATE, POTASSIUM CHLORIDE, CALCIUM CHLORIDE 600; 310; 30; 20 MG/100ML; MG/100ML; MG/100ML; MG/100ML
125 INJECTION, SOLUTION INTRAVENOUS CONTINUOUS
Status: DISCONTINUED | OUTPATIENT
Start: 2020-12-09 | End: 2020-12-09 | Stop reason: HOSPADM

## 2020-12-09 RX ORDER — ONDANSETRON 2 MG/ML
4 INJECTION INTRAMUSCULAR; INTRAVENOUS ONCE
Status: DISCONTINUED | OUTPATIENT
Start: 2020-12-09 | End: 2020-12-09 | Stop reason: HOSPADM

## 2020-12-09 RX ORDER — MAGNESIUM SULFATE 100 %
4 CRYSTALS MISCELLANEOUS AS NEEDED
Status: DISCONTINUED | OUTPATIENT
Start: 2020-12-09 | End: 2020-12-09 | Stop reason: HOSPADM

## 2020-12-09 RX ORDER — SODIUM CHLORIDE 9 MG/ML
125 INJECTION, SOLUTION INTRAVENOUS CONTINUOUS
Status: DISCONTINUED | OUTPATIENT
Start: 2020-12-09 | End: 2020-12-09 | Stop reason: HOSPADM

## 2020-12-09 RX ORDER — PROPOFOL 10 MG/ML
INJECTION, EMULSION INTRAVENOUS AS NEEDED
Status: DISCONTINUED | OUTPATIENT
Start: 2020-12-09 | End: 2020-12-09 | Stop reason: HOSPADM

## 2020-12-09 RX ORDER — SODIUM CHLORIDE, SODIUM LACTATE, POTASSIUM CHLORIDE, CALCIUM CHLORIDE 600; 310; 30; 20 MG/100ML; MG/100ML; MG/100ML; MG/100ML
50 INJECTION, SOLUTION INTRAVENOUS CONTINUOUS
Status: DISCONTINUED | OUTPATIENT
Start: 2020-12-09 | End: 2020-12-09 | Stop reason: HOSPADM

## 2020-12-09 RX ORDER — BUPIVACAINE HYDROCHLORIDE 2.5 MG/ML
INJECTION, SOLUTION EPIDURAL; INFILTRATION; INTRACAUDAL AS NEEDED
Status: DISCONTINUED | OUTPATIENT
Start: 2020-12-09 | End: 2020-12-09 | Stop reason: HOSPADM

## 2020-12-09 RX ORDER — ACETAMINOPHEN 500 MG
1000 TABLET ORAL ONCE
Status: COMPLETED | OUTPATIENT
Start: 2020-12-09 | End: 2020-12-09

## 2020-12-09 RX ORDER — HYDROMORPHONE HYDROCHLORIDE 2 MG/ML
0.2 INJECTION, SOLUTION INTRAMUSCULAR; INTRAVENOUS; SUBCUTANEOUS
Status: DISCONTINUED | OUTPATIENT
Start: 2020-12-09 | End: 2020-12-09 | Stop reason: HOSPADM

## 2020-12-09 RX ORDER — ROCURONIUM BROMIDE 10 MG/ML
INJECTION, SOLUTION INTRAVENOUS AS NEEDED
Status: DISCONTINUED | OUTPATIENT
Start: 2020-12-09 | End: 2020-12-09 | Stop reason: HOSPADM

## 2020-12-09 RX ORDER — NALOXONE HYDROCHLORIDE 0.4 MG/ML
0.1 INJECTION, SOLUTION INTRAMUSCULAR; INTRAVENOUS; SUBCUTANEOUS AS NEEDED
Status: DISCONTINUED | OUTPATIENT
Start: 2020-12-09 | End: 2020-12-09 | Stop reason: HOSPADM

## 2020-12-09 RX ORDER — LIDOCAINE HYDROCHLORIDE 20 MG/ML
INJECTION, SOLUTION EPIDURAL; INFILTRATION; INTRACAUDAL; PERINEURAL AS NEEDED
Status: DISCONTINUED | OUTPATIENT
Start: 2020-12-09 | End: 2020-12-09 | Stop reason: HOSPADM

## 2020-12-09 RX ORDER — GLYCOPYRROLATE 0.2 MG/ML
INJECTION INTRAMUSCULAR; INTRAVENOUS AS NEEDED
Status: DISCONTINUED | OUTPATIENT
Start: 2020-12-09 | End: 2020-12-09 | Stop reason: HOSPADM

## 2020-12-09 RX ADMIN — LIDOCAINE HYDROCHLORIDE 140 MG: 20 INJECTION, SOLUTION EPIDURAL; INFILTRATION; INTRACAUDAL; PERINEURAL at 12:02

## 2020-12-09 RX ADMIN — ROCURONIUM BROMIDE 30 MG: 10 INJECTION, SOLUTION INTRAVENOUS at 12:02

## 2020-12-09 RX ADMIN — DEXAMETHASONE SODIUM PHOSPHATE 4 MG: 4 INJECTION, SOLUTION INTRAMUSCULAR; INTRAVENOUS at 09:54

## 2020-12-09 RX ADMIN — Medication 2 MG: at 12:55

## 2020-12-09 RX ADMIN — GLYCOPYRROLATE 0.2 MG: 0.2 INJECTION INTRAMUSCULAR; INTRAVENOUS at 12:54

## 2020-12-09 RX ADMIN — SODIUM CHLORIDE, SODIUM LACTATE, POTASSIUM CHLORIDE, AND CALCIUM CHLORIDE 50 ML/HR: 600; 310; 30; 20 INJECTION, SOLUTION INTRAVENOUS at 09:44

## 2020-12-09 RX ADMIN — Medication 20 MG: at 11:57

## 2020-12-09 RX ADMIN — SODIUM CHLORIDE, SODIUM LACTATE, POTASSIUM CHLORIDE, AND CALCIUM CHLORIDE 1000 ML: 600; 310; 30; 20 INJECTION, SOLUTION INTRAVENOUS at 09:45

## 2020-12-09 RX ADMIN — SUGAMMADEX 200 MG: 100 INJECTION, SOLUTION INTRAVENOUS at 13:05

## 2020-12-09 RX ADMIN — ACETAMINOPHEN 1000 MG: 500 TABLET ORAL at 09:53

## 2020-12-09 RX ADMIN — SODIUM CHLORIDE, SODIUM LACTATE, POTASSIUM CHLORIDE, AND CALCIUM CHLORIDE 125 ML/HR: 600; 310; 30; 20 INJECTION, SOLUTION INTRAVENOUS at 09:46

## 2020-12-09 RX ADMIN — PROPOFOL 140 MG: 10 INJECTION, EMULSION INTRAVENOUS at 12:02

## 2020-12-09 RX ADMIN — ROCURONIUM BROMIDE 10 MG: 10 INJECTION, SOLUTION INTRAVENOUS at 12:20

## 2020-12-09 RX ADMIN — CEFOXITIN SODIUM 2 G: 2 POWDER, FOR SOLUTION INTRAVENOUS at 12:00

## 2020-12-09 RX ADMIN — Medication 30 MG: at 12:02

## 2020-12-09 NOTE — DISCHARGE INSTRUCTIONS
Patient Education        9 Things To Do If You've Been Exposed to COVID-19    Stay home. If you've been exposed, you should stay in isolation for 14 days. Don't go to school, work, or public areas. And don't use public transportation, ride-shares, or taxis unless you have no choice. Leave your home only if you need to get medical care. But call the doctor's office first so they know you're coming, and wear a cloth face cover when you go. Call your doctor. Call your doctor or other health professional to let them know that you've been exposed. They might want you to be tested, or they may have other instructions for you. If you become sick, wear a face cover when you are around other people. It can help stop the spread of the virus when you cough or sneeze. Limit contact with people in your home. If possible, stay in a separate bedroom and use a separate bathroom. Avoid contact with pets and other animals. Cover your mouth and nose with a tissue when you cough or sneeze. Then throw it in the trash right away. Wash your hands often, especially after you cough or sneeze. Use soap and water, and scrub for at least 20 seconds. If soap and water aren't available, use an alcohol-based hand . Don't share personal household items. These include bedding, towels, cups and glasses, and eating utensils. Clean and disinfect your home every day. Use household  or disinfectant wipes or sprays. Take special care to clean things that you grab with your hands. These include doorknobs, remote controls, phones, and handles on your refrigerator and microwave. And don't forget countertops, tabletops, bathrooms, and computer keyboards. Current as of: July 10, 2020               Content Version: 12.6  © 2006-2020 [employer]. Care instructions adapted under license by [employer] (which disclaims liability or warranty for this information).  If you have questions about a medical condition or this instruction, always ask your healthcare professional. [address] any warranty or liability for your use of this information. DISCHARGE SUMMARY from Nurse    PATIENT INSTRUCTIONS:    After general anesthesia or intravenous sedation, for 24 hours or while taking prescription Narcotics:  · Limit your activities  · Do not drive and operate hazardous machinery  · Do not make important personal or business decisions  · Do  not drink alcoholic beverages  · If you have not urinated within 8 hours after discharge, please contact your surgeon on call. Report the following to your surgeon:  · Excessive pain, swelling, redness or odor of or around the surgical area  · Temperature over 100.5  · Nausea and vomiting lasting longer than 4 hours or if unable to take medications  · Any signs of decreased circulation or nerve impairment to extremity: change in color, persistent  numbness, tingling, coldness or increase pain  · Any questions    What to do at Home:  Katlin Gibbons 1 TO 2 WEEKS CALL FOR APPT    If you experience any of the following symptoms heavy bleeding, fevers, severe pain, please follow up with dr Merline Colander    *  Please give a list of your current medications to your Primary Care Provider. *  Please update this list whenever your medications are discontinued, doses are      changed, or new medications (including over-the-counter products) are added. *  Please carry medication information at all times in case of emergency situations. These are general instructions for a healthy lifestyle:    No smoking/ No tobacco products/ Avoid exposure to second hand smoke  Surgeon General's Warning:  Quitting smoking now greatly reduces serious risk to your health.     Obesity, smoking, and sedentary lifestyle greatly increases your risk for illness    A healthy diet, regular physical exercise & weight monitoring are important for maintaining a healthy lifestyle    You may be retaining fluid if you have a history of heart failure or if you experience any of the following symptoms:  Weight gain of 3 pounds or more overnight or 5 pounds in a week, increased swelling in our hands or feet or shortness of breath while lying flat in bed. Please call your doctor as soon as you notice any of these symptoms; do not wait until your next office visit. The discharge information has been reviewed with the patient and caregiver. The patient and caregiver verbalized understanding. Discharge medications reviewed with the patient and caregiver and appropriate educational materials and side effects teaching were provided. ___________________________________________________________________________________________________________________________________    Post-Operative Discharge Instructions  Brittany Capps M.D.  [address] [place] [address]  [phone]    Patient: Latasha Hollingsworth MRN: [id number]  CSN: [id number]    YOB: 1950  Age: 69 y.o. Sex: male    DOA: 12/9/2020 LOS:  LOS: 0 days   Discharge Date:      Acute Diagnoses:  RIGHT INGUINAL HERNIA    Chronic Medical Diagnoses:  Problem List as of 12/9/2020 Date Reviewed: 9/5/2014          Codes Class Noted - Resolved    Colon polyp ICD-10-CM: K63.5  ICD-9-CM: 211.3  9/5/2014 - Present        DDD (degenerative disc disease), lumbar ICD-10-CM: M51.36  ICD-9-CM: 722.52  11/16/2012 - Present        RESOLVED: Spinal stenosis, lumbar ICD-10-CM: M48.061  ICD-9-CM: 724.02  11/16/2012 - 11/21/2012        RESOLVED: HNP (herniated nucleus pulposus), lumbar ICD-10-CM: M51.26  ICD-9-CM: 722.10  11/16/2012 - 11/21/2012              Diet  1. Resume prior to surgery diet as tolerated. Activity  1. Do not drive a car or operate any hazardous machinery the day of surgery. 2. Rest quietly today.   3. No bending or heavy lifting. 4. You may resume other prior to surgery activities as tolerated. 5. You may remove the bandage and shower in 1 day. Drain / Wound Care  1. Follow all drain / wound care instructions exactly as explained by the Nurse at time of discharge. 2. Apply an ice pack to the surgical site for 48 hours. 3. Do not put any salves or ointments on the wound. Allow it to form a dry scab. 4. Leave steri-strips / Dermabond alone. They should be allowed to fall off on their own in 7-14 days. Medications  1. It is important to take your medications exactly as they are prescribed. 2. Keep your medication in the bottles provided by the pharmacist, and keep a list of the medication names, dosages, and times they should be taken in your wallet. Call 911 anytime you think you may need emergency care. For example, call if:  · You passed out (lost consciousness). · You have severe trouble breathing. · You have sudden chest pain and shortness of breath. Notify your Surgeon for any of the followin. Fever, chills, nausea, vomiting, severe abdominal pain or bleeding. 2. If you experience any redness or discharge or sign of infection. 3. Persistent nausea lasting more than 24 hours. If you are unable to reach your Surgeon for any of the symptoms above, you should proceed directly to the nearest Emergency Department. Post-Operative Appointment Information    Call Dr. Frances Mackenzie office tomorrow morning at [phone] to schedule a post-operative office visit in one (1) week. If any questions or concerns arise, call your Surgeon at [phone].     Patient armband removed and shredded

## 2020-12-09 NOTE — INTERVAL H&P NOTE
Update History & Physical 
 
The Patient's History and Physical of December 9,  
 was reviewed with the patient and I examined the patient. There was no change. The surgical site was confirmed by the patient and me. Plan:  The risk, benefits, expected outcome, and alternative to the recommended procedure have been discussed with the patient. Patient understands and wants to proceed with the procedure.  
 
Electronically signed by Quiana Disla MD on 12/9/2020 at 11:32 AM

## 2020-12-09 NOTE — ANESTHESIA PREPROCEDURE EVALUATION
Relevant Problems   No relevant active problems       Anesthetic History   No history of anesthetic complications     Pertinent negatives: No PONV       Review of Systems / Medical History  Patient summary reviewed, nursing notes reviewed and pertinent labs reviewed    Pulmonary    COPD: mild      Smoker ( not today)    Pertinent negatives: No asthma     Neuro/Psych       CVA  TIA     Cardiovascular    Hypertension            Pertinent negatives: No past MI and CAD       GI/Hepatic/Renal     GERD        Pertinent negatives: No liver disease and renal disease   Endo/Other        Arthritis  Pertinent negatives: No diabetes   Other Findings   Comments: etoh 21- 28/ week           Physical Exam    Airway  Mallampati: III  TM Distance: 4 - 6 cm  Neck ROM: decreased range of motion   Mouth opening: Normal     Cardiovascular               Dental  No notable dental hx       Pulmonary                 Abdominal         Other Findings            Anesthetic Plan    ASA: 3  Anesthesia type: general          Induction: Intravenous  Anesthetic plan and risks discussed with: Patient

## 2020-12-09 NOTE — BRIEF OP NOTE
Brief Postoperative Note    Patient: Vanessa Berrios  YOB: 1950  MRN: [id number]    Date of Procedure: 12/9/2020     Pre-Op Diagnosis: RIGHT INGUINAL HERNIA    Post-Op Diagnosis: Same as preoperative diagnosis. Procedure(s):  ROBOTIC REPAIR RIGHT INGUINAL HERNIA,NEURECTOMY, \"SPEC POP\"    Surgeon(s): Yohana Rooney MD    Surgical Assistant: Surg Asst-1: Klaudia Roth    Anesthesia: General     Estimated Blood Loss (mL): Minimal    Complications: None    Specimens: * No specimens in log *     Implants:   Implant Name Type Inv.  Item Serial No.  Lot No. LRB No. Used Action   MESH GEOFF [id number] POLY POLYLACTIC ACID 70% CLLGN 30% GLYC - [id number]  MESH GEOFF [id number] POLY POLYLACTIC ACID 70% CLLGN 30% GLYC  [employer] [facility] SURGICAL_WD [id number] Right 1 Implanted       Drains: * No LDAs found *    Findings: RIH/ventral hernia    Electronically Signed by Ermias Yi MD on 12/9/2020 at 12:52 PM

## 2020-12-09 NOTE — ANESTHESIA POSTPROCEDURE EVALUATION
Procedure(s):  ROBOTIC REPAIR RIGHT INGUINAL HERNIA,NEURECTOMY, \"SPEC POP\". general    Anesthesia Post Evaluation        Comments: Post-Anesthesia Evaluation and Assessment    Cardiovascular Function/Vital Signs  /61 (BP 1 Location: Left arm, BP Patient Position: At rest)   Pulse 78   Temp 37.2 °C (98.9 °F)   Resp 16   Ht 5' 4\" (1.626 m)   Wt 53.1 kg (117 lb)   SpO2 99%   BMI 20.08 kg/m²     Patient is status post Procedure(s):  ROBOTIC REPAIR RIGHT INGUINAL HERNIA,NEURECTOMY, \"SPEC POP\". Nausea/Vomiting: Controlled. Postoperative hydration reviewed and adequate. Pain:  Pain Scale 1: Numeric (0 - 10) (12/09/20 1423)  Pain Intensity 1: 0 (12/09/20 1423)   Managed. Neurological Status:   Neuro (WDL): Within Defined Limits (12/09/20 1423)   At baseline. Mental Status and Level of Consciousness: Arousable. Pulmonary Status:   O2 Device: Room air (12/09/20 1423)   Adequate oxygenation and airway patent. Complications related to anesthesia: None    Post-anesthesia assessment completed. No concerns. Patient has met all discharge requirements.     Signed By: Dada Carrasco MD    December 9, 2020                     INITIAL Post-op Vital signs:   Vitals Value Taken Time   /61 12/9/2020  2:23 PM   Temp 37.2 °C (98.9 °F) 12/9/2020  2:23 PM   Pulse 78 12/9/2020  2:23 PM   Resp 16 12/9/2020  2:23 PM   SpO2 99 % 12/9/2020  2:23 PM

## 2020-12-09 NOTE — PERIOP NOTES
Reviewed PTA medication list with patient/caregiver and patient/caregiver denies any additional medications. Patient admits to having a responsible adult care for them at home for at least 24 hours after surgery. Patient encouraged to use gown warming system and informed that using said warming gown to regulate body temperature prior to a procedure has been shown to help reduce the risks of blood clots and infection. Patient's pharmacy of choice verified and documented in PTA medication section. Dual skin assessment & fall risk band verification completed with Shelley Hercules RN.

## 2020-12-11 NOTE — OP NOTES
[facility] [place]  OPERATIVE REPORT    Name:  Randolph Jackson  MR#:   [id number]  :  1950  ACCOUNT #:  [de-identified]  DATE OF SERVICE:  2020    PREOPERATIVE DIAGNOSIS:  Right inguinal hernia. POSTOPERATIVE DIAGNOSES:  1. Right inguinal hernia. 2.  Ventral hernia, separate problem. PROCEDURES PERFORMED:  1.  Robotic laparoscopic-assisted repair of right inguinal hernia. 2.  Robotic laparoscopic-assisted repair of ventral hernia, separate procedure, -59 modifier. SURGEON:  Rachel Hernandez MD    ASSISTANT:  None. ANESTHESIA:  General endotracheal.    COMPLICATIONS:  None. SPECIMENS REMOVED:  None. IMPLANTS:  none. ESTIMATED BLOOD LOSS:  Minimal.    INDICATIONS:  This is a gentleman who presents with symptomatic right inguinal hernia. He will undergo repair. I have discussed risks, benefits, and alternatives to him including the risk of bleeding, infection, reoperation, use of mesh, possible removal of mesh, possible neurectomy, possible chronic pain, possible loss of testicle. He understands and wishes to proceed. PROCEDURE:  He was placed in the supine position. His abdomen was prepped and draped in the usual fashion. A Veress needle was inserted in the left upper quadrant using one-drop technique. Pneumopreperitoneum was established. An 8 mm robotic port was placed in the supraumbilical position under visualization. An 8 mm AirSeal port was placed in the epigastrium as a working port. Two 8 mm robotic ports were placed, one in the right upper quadrant laterally and one in the left upper quadrant laterally. The abdomen was inspected after the robot was docked in routine fashion. The patient was found to some adhesions to the midline, and these were taken down using the J-hook cautery. Once this was done, it was noted the patient was found to have a direct inguinal hernia on the right side.   He also had a separate ventral hernia in the right lower quadrant which was a separate incidental hernia noted. This appeared to be a small defect approximately 1 to 2 cm. Because the hernia was present, I elected to repair this hernia separately, and the hernia was repaired using a primary repair using a 0 PDS V-Loc continuous suture. After the separate right lower quadrant ventral hernia was repaired, the peritoneal flap was established on the right side using monopolar scissors. Once this was done, the patient was found to have a direct defect, and the direct defect was reduced along with any fatty tissue. The peritoneum was then dissected away from the cord structures as far inferior as possible. Once this was completed, all critical views and spaces were identified and confirmed. No other hernias  tissue was still present. Laparoscopic ProGrip mesh was placed over the inguinal floor and allowed to self-. There was good overlay and overlap of the mesh. Good hemostasis. The peritoneum was closed, and peritoneal flap was closed using a 2-0 PDS V-Loc continuous suture. All ports were removed. Skin incisions were closed with 4-0 Monocryl subcuticular closure and Dermabond. The patient tolerated the procedure well.       MD PRANAV [email or web address]  D:  12/10/2020 16:06  T:  12/10/2020 20:43  JOB #:  [id number]

## 2024-05-30 ENCOUNTER — HOSPITAL ENCOUNTER (OUTPATIENT)
Facility: HOSPITAL | Age: 74
Discharge: HOME | End: 2024-05-30
Payer: MEDICARE

## 2024-05-30 ENCOUNTER — TRANSCRIBE ORDERS (OUTPATIENT)
Facility: HOSPITAL | Age: 74
End: 2024-05-30

## 2024-05-30 DIAGNOSIS — M43.16 SPONDYLOLISTHESIS OF LUMBAR REGION: ICD-10-CM

## 2024-05-30 DIAGNOSIS — M43.16 SPONDYLOLISTHESIS OF LUMBAR REGION: Primary | ICD-10-CM

## 2024-05-30 LAB
ALBUMIN SERPL-MCNC: 4.2 G/DL (ref 3.4–5)
ALBUMIN/GLOB SERPL: 1.5 (ref 0.8–1.7)
ALP SERPL-CCNC: 93 U/L (ref 45–117)
ALT SERPL-CCNC: 14 U/L (ref 16–61)
ANION GAP SERPL CALC-SCNC: 3 MMOL/L (ref 3–18)
AST SERPL-CCNC: 17 U/L (ref 10–38)
BILIRUB SERPL-MCNC: 0.5 MG/DL (ref 0.2–1)
BUN SERPL-MCNC: 17 MG/DL (ref 7–18)
BUN/CREAT SERPL: 17 (ref 12–20)
CALCIUM SERPL-MCNC: 9.1 MG/DL (ref 8.5–10.1)
CHLORIDE SERPL-SCNC: 109 MMOL/L (ref 100–111)
CO2 SERPL-SCNC: 29 MMOL/L (ref 21–32)
CREAT SERPL-MCNC: 1.01 MG/DL (ref 0.6–1.3)
EKG ATRIAL RATE: 75 BPM
EKG DIAGNOSIS: NORMAL
EKG P AXIS: 68 DEGREES
EKG P-R INTERVAL: 152 MS
EKG Q-T INTERVAL: 362 MS
EKG QRS DURATION: 90 MS
EKG QTC CALCULATION (BAZETT): 404 MS
EKG R AXIS: 77 DEGREES
EKG T AXIS: 65 DEGREES
EKG VENTRICULAR RATE: 75 BPM
ERYTHROCYTE [DISTWIDTH] IN BLOOD BY AUTOMATED COUNT: 13.3 % (ref 11.6–14.5)
GLOBULIN SER CALC-MCNC: 2.8 G/DL (ref 2–4)
GLUCOSE SERPL-MCNC: 97 MG/DL (ref 74–99)
HCT VFR BLD AUTO: 44 % (ref 36–48)
HGB BLD-MCNC: 13.9 G/DL (ref 13–16)
MCH RBC QN AUTO: 27.2 PG (ref 24–34)
MCHC RBC AUTO-ENTMCNC: 31.6 G/DL (ref 31–37)
MCV RBC AUTO: 86.1 FL (ref 78–100)
NRBC # BLD: 0 K/UL (ref 0–0.01)
NRBC BLD-RTO: 0 PER 100 WBC
PLATELET # BLD AUTO: 232 K/UL (ref 135–420)
PMV BLD AUTO: 10.4 FL (ref 9.2–11.8)
POTASSIUM SERPL-SCNC: 4 MMOL/L (ref 3.5–5.5)
PROT SERPL-MCNC: 7 G/DL (ref 6.4–8.2)
RBC # BLD AUTO: 5.11 M/UL (ref 4.35–5.65)
SODIUM SERPL-SCNC: 141 MMOL/L (ref 136–145)
WBC # BLD AUTO: 9.8 K/UL (ref 4.6–13.2)

## 2024-05-30 PROCEDURE — 80053 COMPREHEN METABOLIC PANEL: CPT

## 2024-05-30 PROCEDURE — 85027 COMPLETE CBC AUTOMATED: CPT

## 2024-05-30 PROCEDURE — 93005 ELECTROCARDIOGRAM TRACING: CPT

## 2024-05-30 PROCEDURE — 36415 COLL VENOUS BLD VENIPUNCTURE: CPT

## 2024-05-31 LAB
BACTERIA SPEC CULT: NORMAL
BACTERIA SPEC CULT: NORMAL
SERVICE CMNT-IMP: NORMAL

## 2024-06-05 PROBLEM — Z98.890 STATUS POST LUMBAR SURGERY: Status: ACTIVE | Noted: 2024-06-05

## 2024-06-05 PROBLEM — M48.062 SPINAL STENOSIS, LUMBAR REGION WITH NEUROGENIC CLAUDICATION: Status: ACTIVE | Noted: 2024-06-05

## 2024-06-05 RX ORDER — TRANEXAMIC ACID 10 MG/ML
1000 INJECTION, SOLUTION INTRAVENOUS ONCE
Status: CANCELLED | OUTPATIENT
Start: 2024-06-05 | End: 2024-06-05

## 2024-06-05 RX ORDER — SODIUM CHLORIDE 9 MG/ML
INJECTION, SOLUTION INTRAVENOUS PRN
Status: CANCELLED | OUTPATIENT
Start: 2024-06-05

## 2024-06-05 RX ORDER — SODIUM CHLORIDE 0.9 % (FLUSH) 0.9 %
5-40 SYRINGE (ML) INJECTION PRN
Status: CANCELLED | OUTPATIENT
Start: 2024-06-05

## 2024-06-05 RX ORDER — SODIUM CHLORIDE 0.9 % (FLUSH) 0.9 %
5-40 SYRINGE (ML) INJECTION EVERY 12 HOURS SCHEDULED
Status: CANCELLED | OUTPATIENT
Start: 2024-06-05

## 2024-06-05 NOTE — H&P
SMOKING  Status Tobacco Type Units Per Day Yrs Used   Light tobacco smoker Cigarette 4.00      ALCOHOL  There is no history of alcohol use.     Family History:    Disease Detail Family Member Age Cause of Death Comments   Family history of Diabetes mellitus   N    Family history of Hypertension   N    Family history of Osteoarthritis   N      Review of Systems:    GENERAL:  Patient has no signs of fever., chills or weight change  HEAD/ENTM:  Patient has no signs of headaches, dizziness, hearing loss, ringing in ears, sore throat/hoarseness, recent cold, double vision, blurred vision, itchy eyes, eye redness or eye discharge.  CARDIOVASCULAR:  Patient has no signs of chest pain, palpitations, rheumatic fever or heart murmur.  RESPIRATORY:  Patient has no signs of chronic cough, wheezing, difficulty breathing, pain on breathing or shortness of breath.  GASTROINTESTINAL:  Patient has no signs of nausea/vomiting, difficulty swallowing, gas/bloating, indigestion, abdominal pain, diarrhea, bloody stools or hemorrhoids.  GENITOURINARY:  Patient has no signs of blood in urine, painful urinating, burning sensation, bladder/kidney infection, frequent urinating or incontinence.  MUSCULOSKELETAL:  Patient has no signs of fracture/dislocation, sprain/strain, tendonitis, joint stiffness, joint pain, rheumatoid disease, gout or swelling of feet.  INTEGUMENTARY:  Patient has no signs of Raynaud's phenomenon., rash/itching, psoriasis or varicose veins  EMOTIONAL:  Patient has no signs of anxiety, depression, bipolar disorder, memory loss or change in mood.    Vitals:  Date BP Pulse Temp (F) Resp. (per min.) Height (Total in.) Weight (lbs.) BMI   05/02/2024     64.00 108.00 18.54   02/22/2024     64.00  18.54   02/08/2024     64.00  18.54   01/18/2024     64.00  18.37     Physical Examination:    Heart- RRR  Lungs-CTA mono  Abdomen- +BS,soft,nontender  Musculoskeletal:  There is tenderness around the right PSIS. There is no right

## 2024-06-09 ENCOUNTER — ANESTHESIA EVENT (OUTPATIENT)
Facility: HOSPITAL | Age: 74
DRG: 460 | End: 2024-06-09
Payer: MEDICARE

## 2024-06-10 ENCOUNTER — ANESTHESIA (OUTPATIENT)
Facility: HOSPITAL | Age: 74
DRG: 460 | End: 2024-06-10
Payer: MEDICARE

## 2024-06-10 ENCOUNTER — APPOINTMENT (OUTPATIENT)
Facility: HOSPITAL | Age: 74
DRG: 460 | End: 2024-06-10
Attending: ORTHOPAEDIC SURGERY
Payer: MEDICARE

## 2024-06-10 ENCOUNTER — HOSPITAL ENCOUNTER (INPATIENT)
Facility: HOSPITAL | Age: 74
LOS: 2 days | Discharge: HOME HEALTH | DRG: 460 | End: 2024-06-12
Attending: ORTHOPAEDIC SURGERY | Admitting: ORTHOPAEDIC SURGERY
Payer: MEDICARE

## 2024-06-10 DIAGNOSIS — M48.062 SPINAL STENOSIS, LUMBAR REGION WITH NEUROGENIC CLAUDICATION: Primary | ICD-10-CM

## 2024-06-10 LAB
ABO + RH BLD: NORMAL
BLOOD GROUP ANTIBODIES SERPL: NORMAL
SPECIMEN EXP DATE BLD: NORMAL

## 2024-06-10 PROCEDURE — 86850 RBC ANTIBODY SCREEN: CPT

## 2024-06-10 PROCEDURE — 6360000002 HC RX W HCPCS: Performed by: NURSE ANESTHETIST, CERTIFIED REGISTERED

## 2024-06-10 PROCEDURE — 6370000000 HC RX 637 (ALT 250 FOR IP): Performed by: ORTHOPAEDIC SURGERY

## 2024-06-10 PROCEDURE — 6370000000 HC RX 637 (ALT 250 FOR IP): Performed by: ANESTHESIOLOGY

## 2024-06-10 PROCEDURE — 2500000003 HC RX 250 WO HCPCS: Performed by: NURSE ANESTHETIST, CERTIFIED REGISTERED

## 2024-06-10 PROCEDURE — 7100000000 HC PACU RECOVERY - FIRST 15 MIN: Performed by: ORTHOPAEDIC SURGERY

## 2024-06-10 PROCEDURE — 86901 BLOOD TYPING SEROLOGIC RH(D): CPT

## 2024-06-10 PROCEDURE — 2580000003 HC RX 258: Performed by: ORTHOPAEDIC SURGERY

## 2024-06-10 PROCEDURE — 6360000002 HC RX W HCPCS: Performed by: ORTHOPAEDIC SURGERY

## 2024-06-10 PROCEDURE — 7100000001 HC PACU RECOVERY - ADDTL 15 MIN: Performed by: ORTHOPAEDIC SURGERY

## 2024-06-10 PROCEDURE — 0SG1071 FUSION OF 2 OR MORE LUMBAR VERTEBRAL JOINTS WITH AUTOLOGOUS TISSUE SUBSTITUTE, POSTERIOR APPROACH, POSTERIOR COLUMN, OPEN APPROACH: ICD-10-PCS | Performed by: ORTHOPAEDIC SURGERY

## 2024-06-10 PROCEDURE — 0QP004Z REMOVAL OF INTERNAL FIXATION DEVICE FROM LUMBAR VERTEBRA, OPEN APPROACH: ICD-10-PCS | Performed by: ORTHOPAEDIC SURGERY

## 2024-06-10 PROCEDURE — 3700000000 HC ANESTHESIA ATTENDED CARE: Performed by: ORTHOPAEDIC SURGERY

## 2024-06-10 PROCEDURE — 86900 BLOOD TYPING SEROLOGIC ABO: CPT

## 2024-06-10 PROCEDURE — 3600000012 HC SURGERY LEVEL 2 ADDTL 15MIN: Performed by: ORTHOPAEDIC SURGERY

## 2024-06-10 PROCEDURE — 3600000002 HC SURGERY LEVEL 2 BASE: Performed by: ORTHOPAEDIC SURGERY

## 2024-06-10 PROCEDURE — 2709999900 HC NON-CHARGEABLE SUPPLY: Performed by: ORTHOPAEDIC SURGERY

## 2024-06-10 PROCEDURE — 77002 NEEDLE LOCALIZATION BY XRAY: CPT

## 2024-06-10 PROCEDURE — 2580000003 HC RX 258: Performed by: PHYSICIAN ASSISTANT

## 2024-06-10 PROCEDURE — 36415 COLL VENOUS BLD VENIPUNCTURE: CPT

## 2024-06-10 PROCEDURE — C1713 ANCHOR/SCREW BN/BN,TIS/BN: HCPCS | Performed by: ORTHOPAEDIC SURGERY

## 2024-06-10 PROCEDURE — 6370000000 HC RX 637 (ALT 250 FOR IP): Performed by: PHYSICIAN ASSISTANT

## 2024-06-10 PROCEDURE — 51702 INSERT TEMP BLADDER CATH: CPT

## 2024-06-10 PROCEDURE — 6360000002 HC RX W HCPCS: Performed by: PHYSICIAN ASSISTANT

## 2024-06-10 PROCEDURE — A4217 STERILE WATER/SALINE, 500 ML: HCPCS | Performed by: ORTHOPAEDIC SURGERY

## 2024-06-10 PROCEDURE — 1100000000 HC RM PRIVATE

## 2024-06-10 PROCEDURE — 2720000010 HC SURG SUPPLY STERILE: Performed by: ORTHOPAEDIC SURGERY

## 2024-06-10 PROCEDURE — 3700000001 HC ADD 15 MINUTES (ANESTHESIA): Performed by: ORTHOPAEDIC SURGERY

## 2024-06-10 PROCEDURE — 0RGA071 FUSION OF THORACOLUMBAR VERTEBRAL JOINT WITH AUTOLOGOUS TISSUE SUBSTITUTE, POSTERIOR APPROACH, POSTERIOR COLUMN, OPEN APPROACH: ICD-10-PCS | Performed by: ORTHOPAEDIC SURGERY

## 2024-06-10 DEVICE — SCREW SPNL L45MM DIA7MM TI SGL INNR POLYAX FOR 5.5MM ROD: Type: IMPLANTABLE DEVICE | Site: SPINE LUMBAR | Status: FUNCTIONAL

## 2024-06-10 DEVICE — IMPLANTABLE DEVICE: Type: IMPLANTABLE DEVICE | Site: SPINE LUMBAR | Status: FUNCTIONAL

## 2024-06-10 DEVICE — SET SCR SPNL L25MM DIA5.5MM TI FOR 5.5MM ROD EXPEDIUM: Type: IMPLANTABLE DEVICE | Site: SPINE LUMBAR | Status: FUNCTIONAL

## 2024-06-10 DEVICE — CONNECTOR SPNL L38-47MM TI TRNSVRS SNAP ON FOR 5.5/6MM ROD: Type: IMPLANTABLE DEVICE | Site: SPINE LUMBAR | Status: FUNCTIONAL

## 2024-06-10 DEVICE — GRAFT BNE XL: Type: IMPLANTABLE DEVICE | Site: SPINE LUMBAR | Status: FUNCTIONAL

## 2024-06-10 DEVICE — 6 CC SYRINGE OF BG PUTTY BIOACTIVE BONE GRAFT SUBSTITUTE.
Type: IMPLANTABLE DEVICE | Site: SPINE LUMBAR | Status: FUNCTIONAL
Brand: FIBERGRAFT BG PUTTY

## 2024-06-10 DEVICE — SCREW SPNL L50MM DIA7MM TI SGL INNR POLYAX FOR 5.5MM ROD: Type: IMPLANTABLE DEVICE | Site: SPINE LUMBAR | Status: FUNCTIONAL

## 2024-06-10 DEVICE — SCREW SPNL L40MM DIA7MM TI SGL INNR POLYAX FOR 5.5MM ROD: Type: IMPLANTABLE DEVICE | Site: SPINE LUMBAR | Status: FUNCTIONAL

## 2024-06-10 RX ORDER — MIDAZOLAM HYDROCHLORIDE 1 MG/ML
INJECTION INTRAMUSCULAR; INTRAVENOUS PRN
Status: DISCONTINUED | OUTPATIENT
Start: 2024-06-10 | End: 2024-06-10 | Stop reason: SDUPTHER

## 2024-06-10 RX ORDER — PROPOFOL 10 MG/ML
INJECTION, EMULSION INTRAVENOUS PRN
Status: DISCONTINUED | OUTPATIENT
Start: 2024-06-10 | End: 2024-06-10 | Stop reason: SDUPTHER

## 2024-06-10 RX ORDER — ONDANSETRON 2 MG/ML
4 INJECTION INTRAMUSCULAR; INTRAVENOUS
Status: DISCONTINUED | OUTPATIENT
Start: 2024-06-10 | End: 2024-06-10 | Stop reason: HOSPADM

## 2024-06-10 RX ORDER — SODIUM CHLORIDE 0.9 % (FLUSH) 0.9 %
5-40 SYRINGE (ML) INJECTION PRN
Status: DISCONTINUED | OUTPATIENT
Start: 2024-06-10 | End: 2024-06-12 | Stop reason: HOSPADM

## 2024-06-10 RX ORDER — EPHEDRINE SULFATE 5 MG/ML
INJECTION INTRAVENOUS PRN
Status: DISCONTINUED | OUTPATIENT
Start: 2024-06-10 | End: 2024-06-10 | Stop reason: SDUPTHER

## 2024-06-10 RX ORDER — SODIUM CHLORIDE 9 MG/ML
INJECTION, SOLUTION INTRAVENOUS PRN
Status: DISCONTINUED | OUTPATIENT
Start: 2024-06-10 | End: 2024-06-10 | Stop reason: HOSPADM

## 2024-06-10 RX ORDER — ACETAMINOPHEN 325 MG/1
650 TABLET ORAL EVERY 4 HOURS PRN
Status: DISCONTINUED | OUTPATIENT
Start: 2024-06-10 | End: 2024-06-12 | Stop reason: HOSPADM

## 2024-06-10 RX ORDER — FENTANYL CITRATE 50 UG/ML
INJECTION, SOLUTION INTRAMUSCULAR; INTRAVENOUS PRN
Status: DISCONTINUED | OUTPATIENT
Start: 2024-06-10 | End: 2024-06-10 | Stop reason: SDUPTHER

## 2024-06-10 RX ORDER — HEPARIN SODIUM 10000 [USP'U]/ML
INJECTION, SOLUTION INTRAVENOUS; SUBCUTANEOUS PRN
Status: DISCONTINUED | OUTPATIENT
Start: 2024-06-10 | End: 2024-06-10 | Stop reason: ALTCHOICE

## 2024-06-10 RX ORDER — SODIUM CHLORIDE 0.9 % (FLUSH) 0.9 %
5-40 SYRINGE (ML) INJECTION PRN
Status: DISCONTINUED | OUTPATIENT
Start: 2024-06-10 | End: 2024-06-10 | Stop reason: HOSPADM

## 2024-06-10 RX ORDER — LIDOCAINE HYDROCHLORIDE 20 MG/ML
INJECTION, SOLUTION EPIDURAL; INFILTRATION; INTRACAUDAL; PERINEURAL PRN
Status: DISCONTINUED | OUTPATIENT
Start: 2024-06-10 | End: 2024-06-10 | Stop reason: SDUPTHER

## 2024-06-10 RX ORDER — DIPHENHYDRAMINE HYDROCHLORIDE 50 MG/ML
12.5 INJECTION INTRAMUSCULAR; INTRAVENOUS EVERY 6 HOURS PRN
Status: DISCONTINUED | OUTPATIENT
Start: 2024-06-10 | End: 2024-06-12 | Stop reason: HOSPADM

## 2024-06-10 RX ORDER — DIPHENHYDRAMINE HYDROCHLORIDE 50 MG/ML
12.5 INJECTION INTRAMUSCULAR; INTRAVENOUS
Status: DISCONTINUED | OUTPATIENT
Start: 2024-06-10 | End: 2024-06-10 | Stop reason: HOSPADM

## 2024-06-10 RX ORDER — MAGNESIUM HYDROXIDE 1200 MG/15ML
LIQUID ORAL CONTINUOUS PRN
Status: COMPLETED | OUTPATIENT
Start: 2024-06-10 | End: 2024-06-10

## 2024-06-10 RX ORDER — SODIUM CHLORIDE 0.9 % (FLUSH) 0.9 %
5-40 SYRINGE (ML) INJECTION EVERY 12 HOURS SCHEDULED
Status: DISCONTINUED | OUTPATIENT
Start: 2024-06-10 | End: 2024-06-12 | Stop reason: HOSPADM

## 2024-06-10 RX ORDER — KETAMINE HCL IN NACL, ISO-OSM 100MG/10ML
SYRINGE (ML) INJECTION PRN
Status: DISCONTINUED | OUTPATIENT
Start: 2024-06-10 | End: 2024-06-10 | Stop reason: SDUPTHER

## 2024-06-10 RX ORDER — NALOXONE HYDROCHLORIDE 0.4 MG/ML
INJECTION, SOLUTION INTRAMUSCULAR; INTRAVENOUS; SUBCUTANEOUS PRN
Status: DISCONTINUED | OUTPATIENT
Start: 2024-06-10 | End: 2024-06-11

## 2024-06-10 RX ORDER — OXYCODONE HYDROCHLORIDE 5 MG/1
5 TABLET ORAL EVERY 4 HOURS PRN
Status: DISCONTINUED | OUTPATIENT
Start: 2024-06-10 | End: 2024-06-12 | Stop reason: HOSPADM

## 2024-06-10 RX ORDER — IPRATROPIUM BROMIDE AND ALBUTEROL SULFATE 2.5; .5 MG/3ML; MG/3ML
1 SOLUTION RESPIRATORY (INHALATION)
Status: DISCONTINUED | OUTPATIENT
Start: 2024-06-10 | End: 2024-06-10 | Stop reason: HOSPADM

## 2024-06-10 RX ORDER — DROPERIDOL 2.5 MG/ML
0.62 INJECTION, SOLUTION INTRAMUSCULAR; INTRAVENOUS
Status: DISCONTINUED | OUTPATIENT
Start: 2024-06-10 | End: 2024-06-10 | Stop reason: HOSPADM

## 2024-06-10 RX ORDER — LISINOPRIL 20 MG/1
20 TABLET ORAL EVERY EVENING
Status: DISCONTINUED | OUTPATIENT
Start: 2024-06-10 | End: 2024-06-12 | Stop reason: HOSPADM

## 2024-06-10 RX ORDER — SODIUM CHLORIDE, SODIUM LACTATE, POTASSIUM CHLORIDE, CALCIUM CHLORIDE 600; 310; 30; 20 MG/100ML; MG/100ML; MG/100ML; MG/100ML
INJECTION, SOLUTION INTRAVENOUS CONTINUOUS
Status: DISCONTINUED | OUTPATIENT
Start: 2024-06-10 | End: 2024-06-11

## 2024-06-10 RX ORDER — AMLODIPINE BESYLATE 5 MG/1
10 TABLET ORAL EVERY EVENING
Status: DISCONTINUED | OUTPATIENT
Start: 2024-06-10 | End: 2024-06-12 | Stop reason: HOSPADM

## 2024-06-10 RX ORDER — OXYCODONE HYDROCHLORIDE 5 MG/1
5 TABLET ORAL
Status: DISCONTINUED | OUTPATIENT
Start: 2024-06-10 | End: 2024-06-10 | Stop reason: HOSPADM

## 2024-06-10 RX ORDER — ONDANSETRON 2 MG/ML
4 INJECTION INTRAMUSCULAR; INTRAVENOUS EVERY 6 HOURS PRN
Status: DISCONTINUED | OUTPATIENT
Start: 2024-06-10 | End: 2024-06-12 | Stop reason: HOSPADM

## 2024-06-10 RX ORDER — POLYETHYLENE GLYCOL 3350 17 G/17G
17 POWDER, FOR SOLUTION ORAL DAILY
Status: DISCONTINUED | OUTPATIENT
Start: 2024-06-10 | End: 2024-06-12 | Stop reason: HOSPADM

## 2024-06-10 RX ORDER — SODIUM CHLORIDE 0.9 % (FLUSH) 0.9 %
5-40 SYRINGE (ML) INJECTION EVERY 12 HOURS SCHEDULED
Status: DISCONTINUED | OUTPATIENT
Start: 2024-06-10 | End: 2024-06-10 | Stop reason: HOSPADM

## 2024-06-10 RX ORDER — CYCLOBENZAPRINE HCL 10 MG
5 TABLET ORAL 3 TIMES DAILY PRN
Status: DISCONTINUED | OUTPATIENT
Start: 2024-06-10 | End: 2024-06-12 | Stop reason: HOSPADM

## 2024-06-10 RX ORDER — ONDANSETRON 4 MG/1
4 TABLET, ORALLY DISINTEGRATING ORAL EVERY 8 HOURS PRN
Status: DISCONTINUED | OUTPATIENT
Start: 2024-06-10 | End: 2024-06-12 | Stop reason: HOSPADM

## 2024-06-10 RX ORDER — ACETAMINOPHEN 325 MG/1
650 TABLET ORAL ONCE
Status: COMPLETED | OUTPATIENT
Start: 2024-06-10 | End: 2024-06-10

## 2024-06-10 RX ORDER — ROCURONIUM BROMIDE 10 MG/ML
INJECTION, SOLUTION INTRAVENOUS PRN
Status: DISCONTINUED | OUTPATIENT
Start: 2024-06-10 | End: 2024-06-10 | Stop reason: SDUPTHER

## 2024-06-10 RX ORDER — NALOXONE HYDROCHLORIDE 0.4 MG/ML
INJECTION, SOLUTION INTRAMUSCULAR; INTRAVENOUS; SUBCUTANEOUS PRN
Status: DISCONTINUED | OUTPATIENT
Start: 2024-06-10 | End: 2024-06-10 | Stop reason: HOSPADM

## 2024-06-10 RX ORDER — LISINOPRIL AND HYDROCHLOROTHIAZIDE 20; 12.5 MG/1; MG/1
1 TABLET ORAL EVERY EVENING
Status: DISCONTINUED | OUTPATIENT
Start: 2024-06-10 | End: 2024-06-10 | Stop reason: RX

## 2024-06-10 RX ORDER — HYDROCHLOROTHIAZIDE 25 MG/1
12.5 TABLET ORAL EVERY EVENING
Status: DISCONTINUED | OUTPATIENT
Start: 2024-06-10 | End: 2024-06-12 | Stop reason: HOSPADM

## 2024-06-10 RX ORDER — OXYCODONE HYDROCHLORIDE 5 MG/1
10 TABLET ORAL EVERY 6 HOURS PRN
Status: DISCONTINUED | OUTPATIENT
Start: 2024-06-10 | End: 2024-06-12 | Stop reason: HOSPADM

## 2024-06-10 RX ORDER — BISACODYL 5 MG/1
5 TABLET, DELAYED RELEASE ORAL DAILY
Status: DISCONTINUED | OUTPATIENT
Start: 2024-06-10 | End: 2024-06-12 | Stop reason: HOSPADM

## 2024-06-10 RX ORDER — MAGNESIUM HYDROXIDE/ALUMINUM HYDROXICE/SIMETHICONE 120; 1200; 1200 MG/30ML; MG/30ML; MG/30ML
15 SUSPENSION ORAL EVERY 6 HOURS PRN
Status: DISCONTINUED | OUTPATIENT
Start: 2024-06-10 | End: 2024-06-12 | Stop reason: HOSPADM

## 2024-06-10 RX ORDER — FENTANYL CITRATE 50 UG/ML
25 INJECTION, SOLUTION INTRAMUSCULAR; INTRAVENOUS EVERY 5 MIN PRN
Status: DISCONTINUED | OUTPATIENT
Start: 2024-06-10 | End: 2024-06-10 | Stop reason: HOSPADM

## 2024-06-10 RX ORDER — ONDANSETRON 2 MG/ML
INJECTION INTRAMUSCULAR; INTRAVENOUS PRN
Status: DISCONTINUED | OUTPATIENT
Start: 2024-06-10 | End: 2024-06-10 | Stop reason: SDUPTHER

## 2024-06-10 RX ORDER — PHENYLEPHRINE HCL IN 0.9% NACL 1 MG/10 ML
SYRINGE (ML) INTRAVENOUS PRN
Status: DISCONTINUED | OUTPATIENT
Start: 2024-06-10 | End: 2024-06-10 | Stop reason: SDUPTHER

## 2024-06-10 RX ORDER — HYDROMORPHONE HYDROCHLORIDE 1 MG/ML
0.5 INJECTION, SOLUTION INTRAMUSCULAR; INTRAVENOUS; SUBCUTANEOUS EVERY 5 MIN PRN
Status: DISCONTINUED | OUTPATIENT
Start: 2024-06-10 | End: 2024-06-10 | Stop reason: HOSPADM

## 2024-06-10 RX ORDER — LABETALOL HYDROCHLORIDE 5 MG/ML
10 INJECTION, SOLUTION INTRAVENOUS
Status: DISCONTINUED | OUTPATIENT
Start: 2024-06-10 | End: 2024-06-10 | Stop reason: HOSPADM

## 2024-06-10 RX ORDER — SODIUM CHLORIDE, SODIUM LACTATE, POTASSIUM CHLORIDE, CALCIUM CHLORIDE 600; 310; 30; 20 MG/100ML; MG/100ML; MG/100ML; MG/100ML
INJECTION, SOLUTION INTRAVENOUS CONTINUOUS
Status: DISCONTINUED | OUTPATIENT
Start: 2024-06-10 | End: 2024-06-10 | Stop reason: HOSPADM

## 2024-06-10 RX ORDER — SODIUM CHLORIDE 9 MG/ML
INJECTION, SOLUTION INTRAVENOUS PRN
Status: DISCONTINUED | OUTPATIENT
Start: 2024-06-10 | End: 2024-06-11

## 2024-06-10 RX ORDER — DIPHENHYDRAMINE HCL 25 MG
25 CAPSULE ORAL EVERY 6 HOURS PRN
Status: DISCONTINUED | OUTPATIENT
Start: 2024-06-10 | End: 2024-06-12 | Stop reason: HOSPADM

## 2024-06-10 RX ADMIN — OXYCODONE HYDROCHLORIDE 10 MG: 5 TABLET ORAL at 20:33

## 2024-06-10 RX ADMIN — Medication 20 MG: at 14:02

## 2024-06-10 RX ADMIN — EPHEDRINE SULFATE 10 MG: 5 INJECTION INTRAVENOUS at 13:20

## 2024-06-10 RX ADMIN — ACETAMINOPHEN 650 MG: 325 TABLET ORAL at 10:38

## 2024-06-10 RX ADMIN — SODIUM CHLORIDE, SODIUM LACTATE, POTASSIUM CHLORIDE, AND CALCIUM CHLORIDE: 600; 310; 30; 20 INJECTION, SOLUTION INTRAVENOUS at 23:39

## 2024-06-10 RX ADMIN — SODIUM CHLORIDE, SODIUM LACTATE, POTASSIUM CHLORIDE, AND CALCIUM CHLORIDE: 600; 310; 30; 20 INJECTION, SOLUTION INTRAVENOUS at 10:53

## 2024-06-10 RX ADMIN — ROCURONIUM BROMIDE 20 MG: 10 INJECTION, SOLUTION INTRAVENOUS at 13:22

## 2024-06-10 RX ADMIN — LIDOCAINE HYDROCHLORIDE 40 MG: 20 INJECTION, SOLUTION EPIDURAL; INFILTRATION; INTRACAUDAL; PERINEURAL at 14:32

## 2024-06-10 RX ADMIN — Medication 100 MCG: at 12:29

## 2024-06-10 RX ADMIN — FENTANYL CITRATE 100 MCG: 50 INJECTION, SOLUTION INTRAMUSCULAR; INTRAVENOUS at 11:56

## 2024-06-10 RX ADMIN — MIDAZOLAM 2 MG: 1 INJECTION INTRAMUSCULAR; INTRAVENOUS at 11:50

## 2024-06-10 RX ADMIN — Medication 30 MG: at 12:46

## 2024-06-10 RX ADMIN — POLYETHYLENE GLYCOL 3350 17 G: 17 POWDER, FOR SOLUTION ORAL at 17:50

## 2024-06-10 RX ADMIN — PROPOFOL 100 MG: 10 INJECTION, EMULSION INTRAVENOUS at 11:57

## 2024-06-10 RX ADMIN — HYDROCHLOROTHIAZIDE 12.5 MG: 25 TABLET ORAL at 17:50

## 2024-06-10 RX ADMIN — WATER 2000 MG: 1 INJECTION INTRAMUSCULAR; INTRAVENOUS; SUBCUTANEOUS at 19:55

## 2024-06-10 RX ADMIN — LISINOPRIL 20 MG: 20 TABLET ORAL at 17:50

## 2024-06-10 RX ADMIN — ROCURONIUM BROMIDE 20 MG: 10 INJECTION, SOLUTION INTRAVENOUS at 12:47

## 2024-06-10 RX ADMIN — ROCURONIUM BROMIDE 50 MG: 10 INJECTION, SOLUTION INTRAVENOUS at 11:57

## 2024-06-10 RX ADMIN — ONDANSETRON HYDROCHLORIDE 4 MG: 2 INJECTION INTRAMUSCULAR; INTRAVENOUS at 14:09

## 2024-06-10 RX ADMIN — SUGAMMADEX 100 MG: 100 INJECTION, SOLUTION INTRAVENOUS at 14:14

## 2024-06-10 RX ADMIN — BISACODYL 5 MG: 5 TABLET, COATED ORAL at 17:50

## 2024-06-10 RX ADMIN — SODIUM CHLORIDE, SODIUM LACTATE, POTASSIUM CHLORIDE, AND CALCIUM CHLORIDE: 600; 310; 30; 20 INJECTION, SOLUTION INTRAVENOUS at 12:50

## 2024-06-10 RX ADMIN — AMLODIPINE BESYLATE 10 MG: 5 TABLET ORAL at 17:50

## 2024-06-10 RX ADMIN — Medication: at 14:52

## 2024-06-10 RX ADMIN — WATER 2000 MG: 1 INJECTION INTRAMUSCULAR; INTRAVENOUS; SUBCUTANEOUS at 12:05

## 2024-06-10 RX ADMIN — Medication 200 MCG: at 12:19

## 2024-06-10 RX ADMIN — LIDOCAINE HYDROCHLORIDE 60 MG: 20 INJECTION, SOLUTION EPIDURAL; INFILTRATION; INTRACAUDAL; PERINEURAL at 11:57

## 2024-06-10 ASSESSMENT — PAIN SCALES - GENERAL
PAINLEVEL_OUTOF10: 0
PAINLEVEL_OUTOF10: 7
PAINLEVEL_OUTOF10: 4
PAINLEVEL_OUTOF10: 5
PAINLEVEL_OUTOF10: 10
PAINLEVEL_OUTOF10: 0

## 2024-06-10 ASSESSMENT — LIFESTYLE VARIABLES: SMOKING_STATUS: 1

## 2024-06-10 ASSESSMENT — PAIN DESCRIPTION - ORIENTATION
ORIENTATION: MID

## 2024-06-10 ASSESSMENT — PAIN DESCRIPTION - LOCATION
LOCATION: BACK

## 2024-06-10 ASSESSMENT — PAIN - FUNCTIONAL ASSESSMENT
PAIN_FUNCTIONAL_ASSESSMENT: 0-10
PAIN_FUNCTIONAL_ASSESSMENT: ACTIVITIES ARE NOT PREVENTED
PAIN_FUNCTIONAL_ASSESSMENT: PREVENTS OR INTERFERES SOME ACTIVE ACTIVITIES AND ADLS

## 2024-06-10 ASSESSMENT — PAIN DESCRIPTION - PAIN TYPE
TYPE: SURGICAL PAIN

## 2024-06-10 ASSESSMENT — PAIN DESCRIPTION - DESCRIPTORS
DESCRIPTORS: SORE
DESCRIPTORS: ACHING
DESCRIPTORS: SORE
DESCRIPTORS: SORE

## 2024-06-10 ASSESSMENT — PAIN DESCRIPTION - FREQUENCY
FREQUENCY: CONTINUOUS

## 2024-06-10 ASSESSMENT — PAIN DESCRIPTION - ONSET
ONSET: ON-GOING

## 2024-06-10 NOTE — ANESTHESIA PRE PROCEDURE
Department of Anesthesiology  Preprocedure Note       Name:  Evans Madden   Age:  74 y.o.  :  1950                                          MRN:  [id number]         Date:  2024      Surgeon: Surgeon(s):  Venkatesh Toth MD    Procedure: Procedure(s):  REVISION THORACIC 12- LUMBAR 5 REVISION DECOMPRESSION FUSION, REMOVAL SYNTHES CLICK X WITH C-ARM **INPATIENT STATUS DUE TO MEDICARE**    Medications prior to admission:   Prior to Admission medications    Medication Sig Start Date End Date Taking? Authorizing Provider   vitamin B-12 (CYANOCOBALAMIN) 100 MCG tablet Take 0.5 tablets by mouth every 30 days    Provider, MD Radha   amLODIPine (NORVASC) 10 MG tablet Take 1 tablet by mouth every evening    Automatic Reconciliation, Ar   aspirin 325 MG tablet Take by mouth daily    Automatic Reconciliation, Ar   lisinopril-hydroCHLOROthiazide (PRINZIDE;ZESTORETIC) 20-12.5 MG per tablet Take 1 tablet by mouth every evening    Automatic Reconciliation, Ar   rosuvastatin (CRESTOR) 10 MG tablet Take 1 tablet by mouth every evening    Automatic Reconciliation, Ar       Current medications:    No current facility-administered medications for this encounter.     Current Outpatient Medications   Medication Sig Dispense Refill    vitamin B-12 (CYANOCOBALAMIN) 100 MCG tablet Take 0.5 tablets by mouth every 30 days      amLODIPine (NORVASC) 10 MG tablet Take 1 tablet by mouth every evening      aspirin 325 MG tablet Take by mouth daily      lisinopril-hydroCHLOROthiazide (PRINZIDE;ZESTORETIC) 20-12.5 MG per tablet Take 1 tablet by mouth every evening      rosuvastatin (CRESTOR) 10 MG tablet Take 1 tablet by mouth every evening         Allergies:  No Known Allergies    Problem List:    Patient Active Problem List   Diagnosis Code    Colon polyp K63.5    DDD (degenerative disc disease), lumbar M51.36    Spinal stenosis, lumbar region with neurogenic claudication M48.062    Status post lumbar surgery Z98.890

## 2024-06-10 NOTE — PERIOP NOTE
Reviewed PTA medication list with patient/caregiver and patient/caregiver denies any additional medications.     Patient admits to having a responsible adult care for them at home for at least 24 hours after surgery.    Patient encouraged to use gown warming system and informed that using said warming gown to regulate body temperature prior to a procedure has been shown to help reduce the risks of blood clots and infection.    Patient's pharmacy of choice verified and documented in PTA medication section.    Dual skin assessment & fall risk band verification completed with A Alonzo GRIGGS.

## 2024-06-10 NOTE — PERIOP NOTE
TRANSFER - OUT REPORT:    Verbal report given to ANSON Rosado on Evans Madden  being transferred to [address] [place] [address]  for routine post-op       Report consisted of patient's Situation, Background, Assessment and   Recommendations(SBAR).     Information from the following report(s) Nurse Handoff Report was reviewed with the receiving nurse.           Lines:   Peripheral IV 06/10/24 Posterior;Right Hand (Active)   Site Assessment Clean, dry & intact 06/10/24 1525   Line Status Infusing 06/10/24 1525   Line Care Connections checked and tightened 06/10/24 1525   Phlebitis Assessment No symptoms 06/10/24 1525   Infiltration Assessment 0 06/10/24 1525   Alcohol Cap Used No 06/10/24 1525   Dressing Status Clean, dry & intact 06/10/24 1525   Dressing Type Transparent 06/10/24 1525        Opportunity for questions and clarification was provided.      Patient transported with:  Registered Nurse  PCA verified with ANSON Rosado

## 2024-06-10 NOTE — OP NOTE
Patient: Evans Madden MRN: [id number]  SSN: [id number]    YOB: 1950  Age: 74 y.o.  Sex: male        Date of Procedure: [unfilled]   Preoperative Diagnosis: Spondylolisthesis of lumbar region [M43.16]  Postoperative Diagnosis: * No post-op diagnosis entered *    Procedure: Procedure(s):  REVISION THORACIC 12- LUMBAR 5 REVISION DECOMPRESSION FUSION, REMOVAL SYNTHES CLICK X WITH C-ARM **INPATIENT STATUS DUE TO MEDICARE**  Surgeon(s) and Role:     * Venkatesh Toth MD - Primary  Assistant: Coreen Cleveland PA-C  OR Assitance: Surgical Assistant: Ajit Stein  Scrub Person First: Noy Souza  Physician Assistant: Coreen Cleveland PA  Anesthesia: General   Estimated Blood Loss: 50cc  Fluids: 1000cc   Specimens: * No specimens in log *   Findings: same  Complications: none  Implants:   Implant Name Type Inv. Item Serial No.  Lot No. LRB No. Used Action   GRAFT BONE SUB M 6CC BIOACTIVE GLS PUTTY FIBERGRFT - [id number]  GRAFT BONE SUB M 6CC BIOACTIVE GLS PUTTY FIBERGRFT  PROSIDYAN-WD [id number] N/A 1 Implanted   GRAFT BONE SUB M 6CC BIOACTIVE GLS PUTTY FIBERGRFT - [id number]  GRAFT BONE SUB M 6CC BIOACTIVE GLS PUTTY FIBERGRFT  PROSIDYAN-WD [id number] N/A 1 Implanted   GRAFT BNE XL - [id number]  GRAFT BNE XL [id number] BIOLOGICA  N/A 1 Implanted   SET SCR SPNL L25MM DIA5.5MM TI FOR 5.5MM GÉNESIS EXPEDIUM - [id number]  SET SCR SPNL L25MM DIA5.5MM TI FOR 5.5MM GÉNESIS EXPEDIUM 000 [employer] SYNTHES SPINE-WD  N/A 10 Implanted   SCREW SPNL L40MM DIA7MM TI SGL INNR POLYAX FOR 5.5MM GÉNESIS - S000  SCREW SPNL L40MM DIA7MM TI SGL INNR POLYAX FOR 5.5MM GÉNESIS 000 [employer] SYNTHES SPINE-WD  N/A 2 Implanted   SCREW SPNL L35MM DIA7MM TI SGL INNR POLYAX FOR 5.5MM GÉNESIS - S000  SCREW SPNL L35MM DIA7MM TI SGL INNR POLYAX FOR 5.5MM GÉNESIS 000 [employer] SYNTHES SPINE-WD  N/A 2 Implanted   SCREW SPNL L50MM DIA7MM TI SGL INNR POLYAX FOR 5.5MM GÉNESIS - S000  SCREW SPNL L50MM DIA7MM TI SGL INNR POLYAX FOR 5.5MM [facility]IS [facility]

## 2024-06-10 NOTE — PERIOP NOTE
Dr. Hernandez at the bedside gave anesthesia out note patient ok to send to floor.  VSS and pain is well controlled.

## 2024-06-10 NOTE — ANESTHESIA PRE PROCEDURE
Department of Anesthesiology  Preprocedure Note       Name:  Evans Madden   Age:  74 y.o.  :  1950                                          MRN:  [id number]         Date:  6/10/2024      Surgeon: Surgeon(s):  Venkatesh Toth MD    Procedure: Procedure(s):  REVISION THORACIC 12- LUMBAR 5 REVISION DECOMPRESSION FUSION, REMOVAL SYNTHES CLICK X WITH C-ARM **INPATIENT STATUS DUE TO MEDICARE**    Medications prior to admission:   Prior to Admission medications    Medication Sig Start Date End Date Taking? Authorizing Provider   vitamin B-12 (CYANOCOBALAMIN) 100 MCG tablet Take 0.5 tablets by mouth every 30 days    Provider, MD Radha   amLODIPine (NORVASC) 10 MG tablet Take 1 tablet by mouth every evening    Automatic Reconciliation, Ar   aspirin 325 MG tablet Take by mouth daily    Automatic Reconciliation, Ar   lisinopril-hydroCHLOROthiazide (PRINZIDE;ZESTORETIC) 20-12.5 MG per tablet Take 1 tablet by mouth every evening  Patient not taking: Reported on 6/10/2024    Automatic Reconciliation, Ar   rosuvastatin (CRESTOR) 10 MG tablet Take 1 tablet by mouth every evening    Automatic Reconciliation, Ar       Current medications:    Current Facility-Administered Medications   Medication Dose Route Frequency Provider Last Rate Last Admin   • ceFAZolin (ANCEF) 2,000 mg in sterile water 20 mL IV syringe  2,000 mg IntraVENous On Call to OR Venkatesh Toth MD       • lactated ringers IV soln infusion   IntraVENous Continuous Venkatesh Toth  mL/hr at 06/10/24 1053 New Bag at 06/10/24 1053       Allergies:  No Known Allergies    Problem List:    Patient Active Problem List   Diagnosis Code   • Colon polyp K63.5   • DDD (degenerative disc disease), lumbar M51.36   • Spinal stenosis, lumbar region with neurogenic claudication M48.062   • Status post lumbar surgery Z98.890       Past Medical History:        Diagnosis Date   • Hypercholesterolemia    • Hypertension 2020   • Other ill-defined

## 2024-06-10 NOTE — INTERVAL H&P NOTE
Update History & Physical    The patient's History and Physical was reviewed with the patient and I examined the patient. There was no change. The surgical site was confirmed by the patient and me.     Plan: The risks, benefits, expected outcome, and alternative to the recommended procedure have been discussed with the patient. Patient understands and wants to proceed with the procedure.     Electronically signed by LESLI VAUGHAN MD on 6/10/2024 at 10:54 AM

## 2024-06-10 NOTE — PERIOP NOTE
TRANSFER - IN REPORT:    Verbal report received from ORN & CRNA on Evans Madden  being received from OR  for routine post-op      Report consisted of patient's Situation, Background, Assessment and   Recommendations(SBAR).     Information from the following report(s) Nurse Handoff Report was reviewed with the receiving nurse.    Opportunity for questions and clarification was provided.      Assessment completed upon patient's arrival to unit and care assumed.    Patent airway resp easy and even.  Palpable pulses noted to feet, hemovac drain charged & lorenzana catheter.

## 2024-06-10 NOTE — PERIOP NOTE
Reoriented patient to place and time, able at this time to state name and date of birth. Palpable pulses to feet and is able to wiggle toes, has bilat weak hand .  PCA pump is on.

## 2024-06-10 NOTE — ANESTHESIA POSTPROCEDURE EVALUATION
Post-Anesthesia Evaluation and Assessment    Cardiovascular Function/Vital Signs/Pain Score:  Vitals  BP: 131/75  Temp: 97.4 °F (36.3 °C)  Temp Source: Temporal  Pulse: 75  Respirations: 17  SpO2: 98 %  Height: 162.6 cm (5' 4.02\")  Weight - Scale: 51.6 kg (113 lb 12.8 oz)  Pain Level: 0    Patient is status post Procedure(s):  REVISION THORACIC 12- LUMBAR 5 REVISION DECOMPRESSION FUSION, REMOVAL SYNTHES CLICK X WITH C-ARM **INPATIENT STATUS DUE TO MEDICARE**.    Nausea/Vomiting: Controlled.    Postoperative hydration reviewed and adequate.    Pain:  Managed    Mental Status and Level of Consciousness: Baseline and appropriate for discharge.    Adequate oxygenation and airway patent.    Complications related to anesthesia: None    Post-anesthesia assessment completed. No concerns.    Patient has met all discharge requirements.    Signed By: Salazar Hernandez MD    Stefany 10, 2024

## 2024-06-11 PROBLEM — M48.062 SPINAL STENOSIS, LUMBAR REGION WITH NEUROGENIC CLAUDICATION: Status: RESOLVED | Noted: 2024-06-05 | Resolved: 2024-06-11

## 2024-06-11 LAB
HCT VFR BLD AUTO: 32.7 % (ref 36–48)
HGB BLD-MCNC: 10 G/DL (ref 13–16)

## 2024-06-11 PROCEDURE — 2580000003 HC RX 258: Performed by: PHYSICIAN ASSISTANT

## 2024-06-11 PROCEDURE — 6360000002 HC RX W HCPCS: Performed by: PHYSICIAN ASSISTANT

## 2024-06-11 PROCEDURE — 85018 HEMOGLOBIN: CPT

## 2024-06-11 PROCEDURE — 97116 GAIT TRAINING THERAPY: CPT

## 2024-06-11 PROCEDURE — 36415 COLL VENOUS BLD VENIPUNCTURE: CPT

## 2024-06-11 PROCEDURE — 6370000000 HC RX 637 (ALT 250 FOR IP): Performed by: PHYSICIAN ASSISTANT

## 2024-06-11 PROCEDURE — 97161 PT EVAL LOW COMPLEX 20 MIN: CPT

## 2024-06-11 PROCEDURE — 97165 OT EVAL LOW COMPLEX 30 MIN: CPT

## 2024-06-11 PROCEDURE — 97530 THERAPEUTIC ACTIVITIES: CPT

## 2024-06-11 PROCEDURE — 85014 HEMATOCRIT: CPT

## 2024-06-11 PROCEDURE — 1100000000 HC RM PRIVATE

## 2024-06-11 PROCEDURE — 97535 SELF CARE MNGMENT TRAINING: CPT

## 2024-06-11 PROCEDURE — 51798 US URINE CAPACITY MEASURE: CPT

## 2024-06-11 RX ORDER — CYCLOBENZAPRINE HCL 10 MG
5-10 TABLET ORAL 3 TIMES DAILY PRN
Qty: 30 TABLET | Refills: 0 | Status: SHIPPED | OUTPATIENT
Start: 2024-06-11 | End: 2024-06-21

## 2024-06-11 RX ORDER — OXYCODONE HYDROCHLORIDE 5 MG/1
5-7.5 TABLET ORAL EVERY 6 HOURS PRN
Qty: 42 TABLET | Refills: 0 | Status: SHIPPED | OUTPATIENT
Start: 2024-06-11 | End: 2024-06-18

## 2024-06-11 RX ORDER — NALOXONE HYDROCHLORIDE 4 MG/.1ML
1 SPRAY NASAL PRN
Qty: 1 EACH | Refills: 0 | Status: SHIPPED | OUTPATIENT
Start: 2024-06-11

## 2024-06-11 RX ORDER — POLYETHYLENE GLYCOL 3350 17 G/17G
17 POWDER, FOR SOLUTION ORAL 2 TIMES DAILY PRN
Qty: 28 G | Refills: 1 | Status: SHIPPED | OUTPATIENT
Start: 2024-06-11 | End: 2024-06-25

## 2024-06-11 RX ADMIN — ACETAMINOPHEN 650 MG: 325 TABLET ORAL at 20:32

## 2024-06-11 RX ADMIN — SODIUM CHLORIDE, PRESERVATIVE FREE 10 ML: 5 INJECTION INTRAVENOUS at 21:00

## 2024-06-11 RX ADMIN — BISACODYL 5 MG: 5 TABLET, COATED ORAL at 08:03

## 2024-06-11 RX ADMIN — ACETAMINOPHEN 650 MG: 325 TABLET ORAL at 06:19

## 2024-06-11 RX ADMIN — HYDROCHLOROTHIAZIDE 12.5 MG: 25 TABLET ORAL at 17:26

## 2024-06-11 RX ADMIN — POLYETHYLENE GLYCOL 3350 17 G: 17 POWDER, FOR SOLUTION ORAL at 08:03

## 2024-06-11 RX ADMIN — OXYCODONE HYDROCHLORIDE 10 MG: 5 TABLET ORAL at 17:47

## 2024-06-11 RX ADMIN — OXYCODONE HYDROCHLORIDE 10 MG: 5 TABLET ORAL at 08:04

## 2024-06-11 RX ADMIN — WATER 2000 MG: 1 INJECTION INTRAMUSCULAR; INTRAVENOUS; SUBCUTANEOUS at 03:35

## 2024-06-11 RX ADMIN — OXYCODONE HYDROCHLORIDE 10 MG: 5 TABLET ORAL at 03:03

## 2024-06-11 RX ADMIN — ONDANSETRON 4 MG: 4 TABLET, ORALLY DISINTEGRATING ORAL at 13:36

## 2024-06-11 ASSESSMENT — PAIN DESCRIPTION - PAIN TYPE
TYPE: SURGICAL PAIN

## 2024-06-11 ASSESSMENT — PAIN DESCRIPTION - ORIENTATION
ORIENTATION: MID

## 2024-06-11 ASSESSMENT — PAIN SCALES - GENERAL
PAINLEVEL_OUTOF10: 7
PAINLEVEL_OUTOF10: 10
PAINLEVEL_OUTOF10: 10
PAINLEVEL_OUTOF10: 7
PAINLEVEL_OUTOF10: 5
PAINLEVEL_OUTOF10: 9

## 2024-06-11 ASSESSMENT — PAIN DESCRIPTION - LOCATION
LOCATION: BACK

## 2024-06-11 ASSESSMENT — PAIN - FUNCTIONAL ASSESSMENT
PAIN_FUNCTIONAL_ASSESSMENT: PREVENTS OR INTERFERES SOME ACTIVE ACTIVITIES AND ADLS
PAIN_FUNCTIONAL_ASSESSMENT: ACTIVITIES ARE NOT PREVENTED
PAIN_FUNCTIONAL_ASSESSMENT: PREVENTS OR INTERFERES SOME ACTIVE ACTIVITIES AND ADLS
PAIN_FUNCTIONAL_ASSESSMENT: PREVENTS OR INTERFERES SOME ACTIVE ACTIVITIES AND ADLS

## 2024-06-11 ASSESSMENT — PAIN DESCRIPTION - DESCRIPTORS
DESCRIPTORS: SORE
DESCRIPTORS: ACHING;SORE
DESCRIPTORS: SORE
DESCRIPTORS: SORE;ACHING
DESCRIPTORS: SORE

## 2024-06-11 ASSESSMENT — PAIN DESCRIPTION - FREQUENCY
FREQUENCY: INTERMITTENT
FREQUENCY: CONTINUOUS
FREQUENCY: CONTINUOUS
FREQUENCY: INTERMITTENT

## 2024-06-11 ASSESSMENT — PAIN DESCRIPTION - ONSET
ONSET: ON-GOING

## 2024-06-11 NOTE — CARE COORDINATION
Discharge Plan: [facility]    CM NW met with patient at bedside, pts roommate Salvador also present. Pt is independent with ADLS, has a walker. Pt is a safe discharge and has a follow up appt, [facility] for PT, and post op pain medication. No discharge needs identified. CM available if discharge needs arise.

## 2024-06-11 NOTE — DISCHARGE INSTRUCTIONS
You should NEVER drive while taking narcotic medication.    RETURN TO WORK :  1. The decision to return to work will be determined on an individual basis.   2.  Many people who have a strenuous job (construction, heavy labor, etc) may need to be off work for up to 12 weeks.   3.  If you need a work note, please let us know as soon as possible, and not the same day you are planning to return to work.        NUTRITION :  1.  Good nutrition is an essential part of healing.   2.  You should eat a balanced diet each day, including fruits, vegetables, dairy products and protein.   3.  Remember to drink plenty of water.   4.  If you have not had a bowel movement within 3 days of surgery, you will need to use a laxative or suppository that can be obtained over-the-counter at your local pharmacy.    BONE STIMULATOR:  1. A spinal bone stimulator may be prescribed for you under certain situations.  2.  A nurse will call you if one has been requested and discuss its use for approximately 4-6 months post-op every day.  3.  This device assists in bone healing and fusion.    MEDICATIONS -  1. You may resume the medications you were taking before surgery, with the general exception of (or DO NOT TAKE) non-steroidal anti-inflammatory medications, such as Motrin, Aleve, Advil Naprosyn, Ibuprofen or aspirin.   2.  You will receive a prescription for pain medication at discharge from the hospital. The pain medication works best if taken before the pain becomes severe.   3.  To reduce stomach upset, always take the medication with food.   4.  Begin to wean yourself off the pain medication during the second week after discharge.   5.  If you need a refill, please call the office during working hours at least 2 days before your prescription runs out. Do not wait until your bottle is empty to call for a refill.   6.  DO NOT drive if you are taking narcotic pain medications.    HOME HEALTH CARE:  1.   A home health care service has been

## 2024-06-12 VITALS
HEIGHT: 64 IN | WEIGHT: 113.8 LBS | RESPIRATION RATE: 16 BRPM | TEMPERATURE: 97.8 F | DIASTOLIC BLOOD PRESSURE: 78 MMHG | SYSTOLIC BLOOD PRESSURE: 132 MMHG | HEART RATE: 82 BPM | BODY MASS INDEX: 19.43 KG/M2 | OXYGEN SATURATION: 93 %

## 2024-06-12 LAB
HCT VFR BLD AUTO: 32.7 % (ref 36–48)
HGB BLD-MCNC: 10.5 G/DL (ref 13–16)

## 2024-06-12 PROCEDURE — 97535 SELF CARE MNGMENT TRAINING: CPT

## 2024-06-12 PROCEDURE — 36415 COLL VENOUS BLD VENIPUNCTURE: CPT

## 2024-06-12 PROCEDURE — 85018 HEMOGLOBIN: CPT

## 2024-06-12 PROCEDURE — 97116 GAIT TRAINING THERAPY: CPT

## 2024-06-12 PROCEDURE — 97530 THERAPEUTIC ACTIVITIES: CPT

## 2024-06-12 PROCEDURE — 6370000000 HC RX 637 (ALT 250 FOR IP): Performed by: PHYSICIAN ASSISTANT

## 2024-06-12 PROCEDURE — 85014 HEMATOCRIT: CPT

## 2024-06-12 RX ADMIN — POLYETHYLENE GLYCOL 3350 17 G: 17 POWDER, FOR SOLUTION ORAL at 10:02

## 2024-06-12 RX ADMIN — ACETAMINOPHEN 650 MG: 325 TABLET ORAL at 04:24

## 2024-06-12 RX ADMIN — OXYCODONE HYDROCHLORIDE 10 MG: 5 TABLET ORAL at 00:02

## 2024-06-12 RX ADMIN — OXYCODONE HYDROCHLORIDE 5 MG: 5 TABLET ORAL at 04:24

## 2024-06-12 RX ADMIN — BISACODYL 5 MG: 5 TABLET, COATED ORAL at 10:02

## 2024-06-12 ASSESSMENT — PAIN SCALES - GENERAL
PAINLEVEL_OUTOF10: 10
PAINLEVEL_OUTOF10: 5
PAINLEVEL_OUTOF10: 6

## 2024-06-12 ASSESSMENT — PAIN DESCRIPTION - ONSET
ONSET: ON-GOING
ONSET: ON-GOING

## 2024-06-12 ASSESSMENT — PAIN DESCRIPTION - DESCRIPTORS
DESCRIPTORS: ACHING
DESCRIPTORS: ACHING

## 2024-06-12 ASSESSMENT — PAIN DESCRIPTION - ORIENTATION
ORIENTATION: MID
ORIENTATION: MID

## 2024-06-12 ASSESSMENT — PAIN DESCRIPTION - FREQUENCY: FREQUENCY: INTERMITTENT

## 2024-06-12 ASSESSMENT — PAIN DESCRIPTION - PAIN TYPE
TYPE: SURGICAL PAIN
TYPE: SURGICAL PAIN

## 2024-06-12 ASSESSMENT — PAIN DESCRIPTION - LOCATION
LOCATION: BACK
LOCATION: BACK

## 2024-06-12 ASSESSMENT — PAIN - FUNCTIONAL ASSESSMENT: PAIN_FUNCTIONAL_ASSESSMENT: PREVENTS OR INTERFERES SOME ACTIVE ACTIVITIES AND ADLS

## 2024-06-12 NOTE — PLAN OF CARE
Problem: Chronic Conditions and Co-morbidities  Goal: Patient's chronic conditions and co-morbidity symptoms are monitored and maintained or improved  6/12/2024 1012 by Jeni Forte RN  Outcome: Adequate for Discharge  6/12/2024 0733 by Lexi Medina RN  Outcome: Progressing  6/11/2024 2049 by Ashlee Abarca RN  Outcome: Progressing     Problem: Pain  Goal: Verbalizes/displays adequate comfort level or baseline comfort level  6/12/2024 1012 by Jeni Forte RN  Outcome: Adequate for Discharge  6/11/2024 2049 by Ashlee Abarca RN  Outcome: Progressing     Problem: Safety - Adult  Goal: Free from fall injury  6/12/2024 1012 by Jeni Forte RN  Outcome: Adequate for Discharge  6/12/2024 0733 by Lexi Medina RN  Outcome: Progressing  6/11/2024 2049 by Ashlee Abarca RN  Outcome: Progressing     Problem: Discharge Planning  Goal: Discharge to home or other facility with appropriate resources  Outcome: Adequate for Discharge     Problem: ABCDS Injury Assessment  Goal: Absence of physical injury  6/12/2024 1012 by Jeni Forte RN  Outcome: Adequate for Discharge  6/12/2024 0733 by Lexi Medina RN  Outcome: Progressing  6/11/2024 2049 by Ashlee Abarca RN  Outcome: Progressing

## 2024-06-12 NOTE — PROGRESS NOTES
Progress Note POD #      Patient: Evans Madden               Sex: male          DOA: 6/10/2024         YOB: 1950      Surgery: Procedure(s):  REVISION THORACIC 12- LUMBAR 5 REVISION DECOMPRESSION FUSION, REMOVAL SYNTHES CLICK X WITH C-ARM **INPATIENT STATUS DUE TO MEDICARE**           LOS: 1 day               Subjective:     No new complaints  Legs feeling better    Objective:      Visit Vitals  /64   Pulse 97   Temp 98.3 °F (36.8 °C) (Oral)   Resp 16   Ht 1.626 m (5' 4.02\")   Wt 51.6 kg (113 lb 12.8 oz)   SpO2 98%   BMI 19.52 kg/m²       Physical Exam:  Neurological: motor strength: 5/5 in lower extremities bilaterally                          sensation: intact to light touch      Intake and Output:  Current Shift:  No intake/output data recorded.  Last three shifts:  06/09 1901 - 06/11 0700  In: 3475.4 [P.O.:300; I.V.:3175.4]  Out: 1585 [Urine:1275; Drains:260]        Lab/Data Reviewed:  Lab Results   Component Value Date/Time    WBC 9.8 05/30/2024 12:11 PM    HGB 10.0 06/11/2024 04:23 AM    HCT 32.7 06/11/2024 04:23 AM     05/30/2024 12:11 PM    MCV 86.1 05/30/2024 12:11 PM     No results found for: \"APTT\"  No results found for: \"INR\", \"PT1\"         Assessment/Plan     Principal Problem:    Spinal stenosis, lumbar region with neurogenic claudication  Active Problems:    Status post lumbar surgery  Resolved Problems:    * No resolved hospital problems. *      1. Stable  2. OOB with PT  3. D/C Planning  4. D/C PCA  5. D/C lorenzana  6. D/C drain & change dressing prior to discharge home.  7.Discharge to home after being cleared by P.T  8. [place] [facility] [place]      
    Progress Note POD #1      Patient: Evans Madden               Sex: male          DOA: 6/10/2024         YOB: 1950      Surgery: Procedure(s):  REVISION THORACIC 12- LUMBAR 5 REVISION DECOMPRESSION FUSION, REMOVAL SYNTHES CLICK X WITH C-ARM **INPATIENT STATUS DUE TO MEDICARE**           LOS: 2 days               Subjective:     No new complaints, Wants to go home.     Objective:      Visit Vitals  /70   Pulse 94   Temp 97.9 °F (36.6 °C) (Oral)   Resp 16   Ht 1.626 m (5' 4.02\")   Wt 51.6 kg (113 lb 12.8 oz)   SpO2 93%   BMI 19.52 kg/m²       Physical Exam:  Neurological: motor strength: 5/5 in lower extremities bilaterally                          sensation: intact to light touch  Patient mobility          Ambulation/Gait Training:  Gait  Gait Training: Yes  Overall Level of Assistance: Contact-guard assistance;Minimum assistance;Additional time  Distance (ft): 90 Feet  Assistive Device: Gait belt;Brace/splint;Walker, rolling  Interventions: Verbal cues;Visual cues;Safety awareness training  Base of Support: Narrowed  Speed/Mirna: Pace decreased (< 100 feet/min)  Step Length: Left shortened;Right lengthened  Swing Pattern: Left asymmetrical;Right asymmetrical  Stance: Weight shift;Time  Gait Abnormalities: Antalgic;Ataxic;Decreased step clearance (overstep on R w/ hard heel slap)               Intake and Output:  Current Shift:  No intake/output data recorded.  Last three shifts:  06/10 1901 - 06/12 0700  In: 2617.4 [P.O.:1242; I.V.:1375.4]  Out: 2130 [Urine:1830; Drains:100]    Lab/Data Reviewed:    Lab/Data Reviewed:  Lab Results   Component Value Date/Time    WBC 9.8 05/30/2024 12:11 PM    HGB 10.5 06/12/2024 04:31 AM    HCT 32.7 06/12/2024 04:31 AM     05/30/2024 12:11 PM    MCV 86.1 05/30/2024 12:11 PM     No results found for: \"APTT\"  No results found for: \"INR\", \"PT1\"         Assessment/Plan     Principal Problem (Resolved):    Spinal stenosis, lumbar region with 
0525-Witnessed 26.5 mls of Dilaudid wasted by ANSON Elliott.  
0714: Bedside report received from Lexi RN (offgoing nurse) to ANSON Valenzuela (oncoming nurse), bedside report included nurse Handoff Report, MAR, & labs, Pt observed resting in bed at safe position, Pt without any issues. Call light within reach, ongoing monitoring in place.      1002: Shift assessment completed, Pt stable at this moment, no s/sx of distress, A&O x4, neuro WDL, chest rise and fall equally, no SOB noted, HOB elevated, LS CTA in all lobes bilaterally A&P, IS use encouraged, HS S1S2 stable & reg, ABD is soft, non-distended & non-tender, Bs x4 normoactive, +2 palpable peripheral pulses bilaterally in all extremities, + CSM, optifoam dressing to lower/mid back is C/D/I, 22g IV noted to Lt FA is patent, intact and SL, transparent dressing in C/D/I, pain rated 5/10, ice applied to site, Pt stated that he did not want any PRN pain medications at this moment, Pt educated on importance of pain control & ambulation, Is education provided, Pt verbalized understanding, Pt resting in bed at safe position & call bell within reach, ongoing monitoring in place.      1015: Pt stable prior to discharge, no s/sx of distress, pain controlled prior to leaving  
0721-  Bedside and Verbal shift change report given to ANSON Rosado (oncoming nurse) by ANSON Elliott (offgoing nurse). Report included the following information Nurse Handoff Report, Adult Overview, Surgery Report, Intake/Output, MAR, and Recent Results. Assumed care of pt at this time.    0758-  Pt A&Ox4. Pt has flat affect and gives limited answers when asked questions. IV to the R hand infusing, dressing CDI. VSS on room air. Dressing to the medial back CDI. Hemovac drain in place draining dark red fluid, site CDI. SCDs intact to BLE. Pt denies trouble breathing/SOB at this time. Pt denies numbness/tingling at this time. Pedal pulses +2, cap refill < 3sec to BLE. Radial pulses +2, cap refill < 3sec to BUE. Safety measures in place, call bell within reach. Encouraged pt to call for help and instructed of importance of urinating once lorenzana catheter removed.     0815-  Drain removed, tip intact, no complications. Gauze and clear dressing applied to site. Dressing to medial back incision removed, optifoam dressing applied. Pt tolerated. Encouraged pt to use call bell if needed, bed alarm on. Safety measures in place, call bell within reach.     1220-  Pt ambulated to restroom w/ nursing assist to attempt voiding. Pt returned to bed. Safety measures in place, call bell within reach. Bed alarm on.    1250-  Bladder scanned pt, > 281 mL result.    1300-  Pt states he wants to try voiding again before before straight cath. Pt ambulated to restroom w/ nursing assist x 2. Pt states that he voided \"a little bit\". Pt returned to bed, Safety measures in place, call bell within reach. Per order, for results  > 180mL and < 400mL, if pt comfortable then encourage voiding measures and re-evaluate in 1-2 hours.     1330-  Removed IV from R hand, IV infiltrated, swelling noted to site. Pt denies pain at this time. Gauze and tape and heat pack applied to site.    1336-  Pt vomited, zofran given per mar. Pt has not yet voided, oral 
1615-  Pt Ox 4. Pt asleep but easy to arouse. Pt has flat affect. IV to the R hand infusing, dressing CDI. VSS on 2L NC. Dressing to the medial back CDI. Hemovac drain in place draining dark red fluid, site CDI. SCDs intact to BLE. Pt denies trouble breathing/SOB at this time. Pt denies numbness/tingling at this time. Pedal pulses +2, cap refill < 3sec to BLE. Radial pulses +2, cap refill < 3sec to BUE. Pt oriented to room, safety measures in place, call bell within reach. PCA rate/dose verified w/ ANSON Nobles upon pt transfer to unit.     1618-  Left voicemail for Salvador Richmond, left call back number.     1635-  Salvador Richmond in to see pt.     1805-  Encouraging pt to use PCA pump as needed, re instructed on use of PCA button.     1940-  Bedside and Verbal shift change report given to ANSON Elliott (oncoming nurse) by ANSON Rosado (offgoing nurse). Report included the following information Nurse Handoff Report, Adult Overview, Surgery Report, Intake/Output, MAR, and Recent Results. PCA rate/dose verified w/ ANSON Elliott upon bedside report.     
1930- Bedside and Verbal shift change report given to ANSON Elliott (oncoming nurse) by ANSON Rosado (offgoing nurse). Report included the following information Nurse Handoff Report, Adult Overview, Surgery Report, Intake/Output, MAR, and Recent Results.      2025-- Patient A&Ox4, O2 at 1LPM. Denies chest pain and SOB. With optifoam dressing to low mid back C/D/I. With gauze and tape dressing to DAMION site.  Denies numbness/tingling/calf pain.  Pain 7/10 with a tolerable level of 5/10. With compression compression device bilaterally. MELIDNA to BLE. Pt educated on unit routine, IS use, q2h rounds, pain management, and \"Up for Meals\". Pt verbalized understanding, no concerns voiced. Call bell and telephone within reach, bed in lowest position. Pt encouraged to call for assistance.     0715- Bedside and Verbal shift change report given to ANSON Valenzuela (oncoming nurse) by ANSON Elliott (offgoing nurse). Report included the following information Nurse Handoff Report, Adult Overview, Surgery Report, Intake/Output, MAR, and Recent Results.    
1937- Bedside and Verbal shift change report given to ANSON Elliott (oncoming nurse) by ANOSN Rosado (offgoing nurse). Report included the following information Nurse Handoff Report, Adult Overview, Surgery Report, Intake/Output, MAR, and Recent Results.      1945- Patient A&Ox4, O2 at 2LPM. Denies chest pain and SOB. With abdominal pad and soft tape dressing to lo mid back C/D/I.  Denies numbness/tingling/calf pain.  Pain 4/10 with a tolerable level of 5/10. With compression device bilaterally. Pt educated on unit routine, IS use, q2h rounds, pain management, and \"Up for Meals\". Pt verbalized understanding, no concerns voiced. Call bell and telephone within reach, bed in lowest position. Pt encouraged to call for assistance.     1950- Re instructed on use of PCA button.    0430- Patient refused for bath wipes and changed of gown/linen.    0525- Wasted 26.5 ml of Dilaudid witnessed by ANSON Breaux.    0530- Back brace applied, assisted patient to stand up and pt tried 2 steps with a walker  but not steady enough to take more steps. Assisted pt return to bed. Put call bell and telephone within reach, bed in lowest position.    0537- Rodriguez catheter removed and instructed pt to call for bathroom assistance.    0721-  Bedside and Verbal shift change report given to ANSON Rosado (oncoming nurse) by ANSON Elliott (offgoing nurse). Report included the following information Nurse Handoff Report, Adult Overview, Surgery Report, Intake/Output, MAR, and Recent Results.      
AVS reviewed with pt and pt brother Salvador Richmond, all questions answered. Pt educated on medication uses and side effects and physican discharge instructions. Pt and brother verbalized understanding. IV removed, no complications. Pt in bed waiting for pharmacy.   
Initiated 6/11/2024 to be met within 1-3 days.  Pt will demonstrate understanding of back precautions as evidenced by performance of functional activity with minimal cues in order to prevent secondary complications (falls, ulcers, infection, etc).  Pt will perform sit<>stands with FWW, LSO, and CGA/SBA to allow patient to get to bathroom at night.  Pt will walk >=50' with FWW, LSO, and CGA/SBA to allow safe home ambulation.     [x]  Patient has met MD augusto beckett for d/c home   [x]  Recommend HH with 24 hour adult care   []  Benefit from additional acute PT session to address:        PHYSICAL THERAPY TREATMENT    Patient: Evans Madden (74 y.o. male)  Date: 6/12/2024  Diagnosis: Spondylolisthesis of lumbar region [M43.16]  Spinal stenosis, lumbar region with neurogenic claudication [M48.062] Spinal stenosis, lumbar region with neurogenic claudication  Procedure(s) (LRB):  REVISION THORACIC 12- LUMBAR 5 REVISION DECOMPRESSION FUSION, REMOVAL SYNTHES CLICK X WITH C-ARM **INPATIENT STATUS DUE TO MEDICARE** (N/A) 2 Days Post-Op  Precautions: Fall Risk, Surgical Protocols,  ,  ,  ,  , Spinal Precautions: No Bending, No Lifting, No Twisting,  ,        ASSESSMENT:  Pt sitting up in chair on chair alarm.  Pt reports pain 10/10 however pt appears NAD.  Pt w/ confusion regarding recent events/situation as pt reports he was in the ED all night.  Pt intermittently needs redirection and frequent vc for safety as pt impulsive and not aware of need for A.  Pt able to transfer sit<>stand CGA.  Gait w/ RW, GB, LSO and CGA/min A in hallway w/ antalgic/ataxic gait pattern.  Pt had LOB posterior and to L requiring min A to correct.  Recommended use of gait belt/belt at home and pt and caregiver ed on use/purpose.  Ed provided for LSO, back precautions, safe mobility and need for 24/7 assistance/mobility.  Improved balance noted in standing/sitting overall.  Pt returned to sitting in chair, chair alarm activated.  
Occupational Therapy Goals:  Initiated 6/12/2024 to be met within 7-10 days.  Short Term Goals  Time Frame for Short Term Goals: 7 days  Short Term Goal 1: The patient will demonstrate ability to complete LB dressing tasks at supervision level with use of AE as needed.  Short Term Goal 2: The patient will demonstrate ability to complete LB bathing tasks at supervision level with use of AE as needed.  Short Term Goal 3: The patient will demonstrate ability to complete toilet transfers at supervision level.  Short Term Goal 4: The patient will demonstrate ability to complete toileting tasks at supervision level.  Short Term Goal 5: The patient will demonstrate ability to complete grooming tasks standing at sink at supervision level.      OCCUPATIONAL THERAPY TREATMENT    Patient: Evans Madden (74 y.o. male)  Date: 6/12/2024  Diagnosis: Spondylolisthesis of lumbar region [M43.16]  Spinal stenosis, lumbar region with neurogenic claudication [M48.062] Spinal stenosis, lumbar region with neurogenic claudication  Procedure(s) (LRB):  REVISION THORACIC 12- LUMBAR 5 REVISION DECOMPRESSION FUSION, REMOVAL SYNTHES CLICK X WITH C-ARM **INPATIENT STATUS DUE TO MEDICARE** (N/A) 2 Days Post-Op  Precautions:   Fall Risk, Surgical Protocols,  ,  ,  ,  , Spinal Precautions: No Bending, No Lifting, No Twisting,  ,   LSO when OOB    Chart, occupational therapy assessment, plan of care, and goals were reviewed.  ASSESSMENT: Upon arrival, pt was seated at EOB with significant other in room. Pt requesting to dress at this time. Pt declined bathing tasks at this time. Pt donned LSO with min A seated at EOB (OT noted improved sitting balance and no posterior lean). OT instructed pt in transfer from EOB to arm rest chair in order to decrease pt's risk for falls. Once seated at arm rest chair, pt demonstrated ability to don shirt with SBA. Pt required min/mod A to complete LB dressing tasks (pt not a candidate for AE training at this 
Referral sent to [facility].  
TRANSFER - IN REPORT:    Verbal report received from ANSON Nobles on Evans Madden  being received from PACU for routine post-op      Report consisted of patient's Situation, Background, Assessment and   Recommendations(SBAR).     Information from the following report(s) Nurse Handoff Report was reviewed with the receiving nurse.    Opportunity for questions and clarification was provided.      Assessment completed upon patient's arrival to unit and care assumed.     
Gait quality remains ataxic and antalgic.  Although pt able to increase gait distance and improvements in quality pt cont to require A therefore recommend SNF rehab vs HHPT w/ 24/7 care.  Pt returned to sitting EOB w/ all needs.  Bed alarm reactivated and tray table placed in front of him.  Nurse Ashlee aware of session and outcomes.    Progression toward goals:   []      Improving appropriately and progressing toward goals  [x]      Improving slowly and progressing toward goals  []      Not making progress toward goals and plan of care will be adjusted     PLAN:  Patient continues to benefit from skilled intervention to address the above impairments.  Continue treatment per established plan of care.    Further Equipment Recommendations for Discharge: none    [facility]:   AM-PAC Inpatient Mobility without Stair Climbing Raw Score : 13    Current research shows that an AM-PAC score of 17 (13 without stairs) or less is not associated with a discharge to the patient's home setting. Based on an AM-PAC score and their current functional mobility deficits, it is recommended that the patient have 5-7 sessions per week of Physical Therapy at d/c to increase the patient's independence.  Currently, this patient demonstrates the potential endurance, and/or tolerance for 3 hours of therapy each day at d/c.    Current research shows that an AM-PAC score of 17 (13 without stairs) or less is not associated with a discharge to the patient's home setting. Based on an AM-PAC score and their current functional mobility deficits, it is recommended that the patient have 3-5 sessions per week of Physical Therapy at d/c to increase the patient's independence.     Current research shows that an AM-PAC score of 18 (14 without stairs) or greater is associated with a discharge to the patient's home setting. Based on an AM-PAC score and their current functional mobility deficits, it is recommended that the patient have 2-3 sessions per week of 
Mobility Training  Bed Mobility Training: Yes  Interventions: Safety awareness training;Verbal cues  Rolling: Minimum assistance  Supine to Sit: Minimum assistance  Sit to Supine: Minimum assistance;Additional time (log-roll method)  Scooting: Minimum assistance  Transfers:                 Transfer Training  Transfer Training: Yes  Interventions: Safety awareness training;Tactile cues;Verbal cues  Sit to Stand: Additional time;Contact-guard assistance (close CGA)  Stand to Sit: Additional time;Contact-guard assistance (close CGA)    ADL Assessment:                  UE Dressing: Minimal assistance  LE Dressing: Moderate assistance       ADL Intervention:    Therapeutic Exercise/Neuromuscular Re-education:    Pain:  Pain level pre-treatment: 10/10   Pain level post-treatment: 10/10   Pain Intervention(s): Rest, Ice, Repositioning   Response to intervention: Nurse notified regarding pt's performance with therapy.     Activity Tolerance:   Activity Tolerance: Patient Tolerated treatment well  Please refer to the flowsheet for vital signs taken during this treatment.    After treatment:   [] Patient left in no apparent distress sitting up in chair  [x] Patient left in no apparent distress in bed  [x] Call bell left within reach  [x] Nursing notified  [] Caregiver present  [] Bed alarm activated    COMMUNICATION/EDUCATION:   Patient Education  Education Given To: Patient;Family  Education Provided: Fall Prevention Strategies;Precautions;Plan of Care;ADL Adaptive Strategies;Transfer Training;Equipment;Energy Conservation;Family Education;Role of Therapy  Education Method: Demonstration;Verbal  Barriers to Learning: Cognition (post anesthetic effects)  Education Outcome: Verbalized understanding    Thank you for this referral.  Mitali Begum OT  Minutes: 48    Eval Complexity: Decision Making: Low Complexity    [facility] AM-PAC® Daily Activity Inpatient Short Form (6-Clicks)*    How much HELP from another person does 
Narrowed  Speed/Mirna: Pace decreased (< 100 feet/min)  Step Length: Left shortened;Right lengthened  Swing Pattern: Left asymmetrical;Right asymmetrical  Stance: Weight shift;Time  Gait Abnormalities: Antalgic;Ataxic;Decreased step clearance (persistent right posterolateral trunk lean; over step on R w/ hard heel slap)  Therapeutic Exercises/Neuromuscular Re-education:   Pain:  Pain level pre-treatment: 10/10   Pain level post-treatment: 10/10   Pain Intervention(s): Medication (see MAR); Rest, Ice, Repositioning  Response to intervention: Nurse Trisha/Eugenie notified   Activity Tolerance:   Activity Tolerance: Patient tolerated treatment well;Patient limited by pain;Patient limited by endurance;Patient limited by fatigue;Treatment limited secondary to medical complications  Please refer to the flowsheet for vital signs taken during this treatment.  After treatment:   []         Patient left in no apparent distress sitting up in chair  [x]         Patient left in no apparent distress in bed  [x]         Call bell left within reach  [x]         Nursing notified  [x]         Caregiver present  [x]         Bed alarm activated  [x]         SCDs applied    COMMUNICATION/EDUCATION:   Patient Education  Education Given To: Patient;Caregiver  Education Provided: Role of Therapy;Plan of Care;Home Exercise Program;Precautions;Transfer Training;Fall Prevention Strategies  Education Method: Verbal;Teach Back;Demonstration  Barriers to Learning: Cognition;Hearing  Education Outcome: Verbalized understanding;Demonstrated understanding;Continued education needed    Thank you for this referral.  LORRI Hines  Minutes: 23    Eval Complexity: Decision Making: Low Complexity

## 2024-06-12 NOTE — PLAN OF CARE
Problem: Chronic Conditions and Co-morbidities  Goal: Patient's chronic conditions and co-morbidity symptoms are monitored and maintained or improved  6/12/2024 0733 by Lexi Medina RN  Outcome: Progressing     Problem: Pain  Goal: Verbalizes/displays adequate comfort level or baseline comfort level  6/11/2024 2049 by Ashlee Abarca RN  Outcome: Progressing     Problem: Pain  Goal: Verbalizes/displays adequate comfort level or baseline comfort level  6/11/2024 2049 by Ashlee Abarca RN  Outcome: Progressing     Problem: Safety - Adult  Goal: Free from fall injury  6/12/2024 0733 by Lexi Medina RN  Outcome: Progressing     Problem: ABCDS Injury Assessment  Goal: Absence of physical injury  6/12/2024 0733 by Lexi Medina RN  Outcome: Progressing

## 2024-06-12 NOTE — DISCHARGE SUMMARY
Discharge Summary    Patient: Evans Madden               Sex: male          DOA: 6/10/2024         YOB: 1950      Age:  74 y.o.        LOS:  LOS: 2 days                Admit Date: 6/10/2024    Discharge Date: 6/12/2024    Admission Diagnoses: Spondylolisthesis of lumbar region [M43.16]  Spinal stenosis, lumbar region with neurogenic claudication [M48.062]    Discharge Diagnoses:  Spondylolisthesis of lumbar region [M43.16]  Spinal stenosis, lumbar region with neurogenic claudication [M48.062]    Discharge Condition: Good    Hospital Course: The patient underwent T12-L5 revision decompression & fusion on 6/10/2024. The patient tolerated the procedure well. Vital signs remained stable and the patient was transferred to  2nd floor surgical unit without complications. The patient remained neurovascularly intact and began getting out of bed with physical therapy on  POD #1. Pain was controlled with Dilaudid PCA and Percocet pills for break through pain. The PCA pump and lorenzana catheter were discontinued on POD#1. The drain was discontinued on POD#2. The patient progressed slowly with physical therapy and was ambulating 90 feet on POD #1 and was therefore discharged the evening of POD#2.     Consults: None    Discharge disposition: Home.    Significant Diagnostic Studies:   Recent Labs     06/11/24  0423 06/12/24 0431   HGB 10.0* 10.5*     Recent Labs     06/11/24  0423 06/12/24 0431   HCT 32.7* 32.7*          Medication List        START taking these medications      cyclobenzaprine 10 MG tablet  Commonly known as: FLEXERIL  Take 0.5-1 tablets by mouth 3 times daily as needed for Muscle spasms     naloxone 4 MG/0.1ML Liqd nasal spray  1 spray by Nasal route as needed for Opioid Reversal     oxyCODONE 5 MG immediate release tablet  Commonly known as: ROXICODONE  Take 1-1.5 tablets by mouth every 6 hours as needed for Pain for up to 7 days. Max Daily Amount: 30 mg     polyethylene glycol 17 GM/SCOOP

## 2024-11-27 ENCOUNTER — HOSPITAL ENCOUNTER (OUTPATIENT)
Facility: HOSPITAL | Age: 74
Setting detail: OUTPATIENT SURGERY
Discharge: HOME | End: 2024-11-27
Attending: INTERNAL MEDICINE | Admitting: INTERNAL MEDICINE
Payer: MEDICARE

## 2024-11-27 VITALS
SYSTOLIC BLOOD PRESSURE: 110 MMHG | OXYGEN SATURATION: 95 % | BODY MASS INDEX: 18.32 KG/M2 | TEMPERATURE: 97.8 F | DIASTOLIC BLOOD PRESSURE: 77 MMHG | WEIGHT: 107.3 LBS | HEIGHT: 64 IN | HEART RATE: 77 BPM | RESPIRATION RATE: 16 BRPM

## 2024-11-27 PROCEDURE — 88305 TISSUE EXAM BY PATHOLOGIST: CPT

## 2024-11-27 PROCEDURE — 2580000003 HC RX 258: Performed by: INTERNAL MEDICINE

## 2024-11-27 PROCEDURE — 99152 MOD SED SAME PHYS/QHP 5/>YRS: CPT | Performed by: INTERNAL MEDICINE

## 2024-11-27 PROCEDURE — 7100000011 HC PHASE II RECOVERY - ADDTL 15 MIN: Performed by: INTERNAL MEDICINE

## 2024-11-27 PROCEDURE — 7100000010 HC PHASE II RECOVERY - FIRST 15 MIN: Performed by: INTERNAL MEDICINE

## 2024-11-27 PROCEDURE — 6360000002 HC RX W HCPCS: Performed by: INTERNAL MEDICINE

## 2024-11-27 PROCEDURE — 3600007502: Performed by: INTERNAL MEDICINE

## 2024-11-27 PROCEDURE — 2709999900 HC NON-CHARGEABLE SUPPLY: Performed by: INTERNAL MEDICINE

## 2024-11-27 PROCEDURE — 88342 IMHCHEM/IMCYTCHM 1ST ANTB: CPT

## 2024-11-27 PROCEDURE — 3600007512: Performed by: INTERNAL MEDICINE

## 2024-11-27 RX ORDER — FENTANYL CITRATE 50 UG/ML
INJECTION, SOLUTION INTRAMUSCULAR; INTRAVENOUS PRN
Status: DISCONTINUED | OUTPATIENT
Start: 2024-11-27 | End: 2024-11-27 | Stop reason: ALTCHOICE

## 2024-11-27 RX ORDER — AMOXICILLIN 500 MG/1
CAPSULE ORAL AS NEEDED
COMMUNITY
Start: 2024-10-22

## 2024-11-27 RX ORDER — SODIUM CHLORIDE 9 MG/ML
INJECTION, SOLUTION INTRAVENOUS CONTINUOUS
Status: DISCONTINUED | OUTPATIENT
Start: 2024-11-27 | End: 2024-11-27 | Stop reason: HOSPADM

## 2024-11-27 RX ORDER — LISINOPRIL AND HYDROCHLOROTHIAZIDE 12.5; 2 MG/1; MG/1
1 TABLET ORAL DAILY
COMMUNITY
Start: 2024-11-22

## 2024-11-27 RX ORDER — MIDAZOLAM HYDROCHLORIDE 1 MG/ML
INJECTION, SOLUTION INTRAMUSCULAR; INTRAVENOUS PRN
Status: DISCONTINUED | OUTPATIENT
Start: 2024-11-27 | End: 2024-11-27 | Stop reason: ALTCHOICE

## 2024-11-27 RX ADMIN — SODIUM CHLORIDE: 9 INJECTION, SOLUTION INTRAVENOUS at 08:36

## 2024-11-27 ASSESSMENT — PAIN - FUNCTIONAL ASSESSMENT
PAIN_FUNCTIONAL_ASSESSMENT: 0-10
PAIN_FUNCTIONAL_ASSESSMENT: 0-10
PAIN_FUNCTIONAL_ASSESSMENT: ADULT NONVERBAL PAIN SCALE (NPVS)
PAIN_FUNCTIONAL_ASSESSMENT: ADULT NONVERBAL PAIN SCALE (NPVS)
PAIN_FUNCTIONAL_ASSESSMENT: 0-10
PAIN_FUNCTIONAL_ASSESSMENT: NONE - DENIES PAIN
PAIN_FUNCTIONAL_ASSESSMENT: 0-10
PAIN_FUNCTIONAL_ASSESSMENT: ADULT NONVERBAL PAIN SCALE (NPVS)
PAIN_FUNCTIONAL_ASSESSMENT: 0-10
PAIN_FUNCTIONAL_ASSESSMENT: NONE - DENIES PAIN
PAIN_FUNCTIONAL_ASSESSMENT: ADULT NONVERBAL PAIN SCALE (NPVS)
PAIN_FUNCTIONAL_ASSESSMENT: ADULT NONVERBAL PAIN SCALE (NPVS)
PAIN_FUNCTIONAL_ASSESSMENT: 0-10

## 2024-11-27 NOTE — H&P
history of a bleeding ulcer about 15 or 20 years ago a we do not have record of this.  Most likely he has a bleeding ulcer and possibly erosive esophagitis with Ochoa esophagus as his care giver tells me that he has bouts of it dysphagia to solid where he has to regurgitate his food.  For now we are going to check the Helicobacter pylori and occult blood in the stools.  We need to check his stomach by doing an upper endoscopy.  He already had a colonoscopy by myself on Stefany 15, 2020 which showed very tortuous difficult sigmoid.  There were then no AVM, polyps or diverticulosis.  He has no family history of cancer.  I am starting him on omeprazole 20 mg daily.   I explained the procedure of upper endoscopy or EGD, the alternative and the risks involved which include but not limited to aspiration, bleeding perforation or reaction to sedation. He was agreeable to this.    Plan Orders CBC, Platelet; No Differential to be performed, H. pylori Stool Ag, EIA to be performed, Iron and TIBC to be performed and Occult Blood, Fecal, IA to be performed.              This 74 year old  patient was referred by Royal [facility].  This 74 year old patient presents for Anemia.    History of Present Illness  1.  Anemia   The symptom(s) began on 11/15/2024. The patient describes it as a severity of mild-moderate. Type of anemia was acquired for deficiency anemia (iron deficient). The problem is a new diagnosis. Pertinent negatives include abdominal pain, black tarry stools, chest pain, chills, cold intolerance, constipation, diarrhea, dizziness, fatigue, headache, hypotension, joint pain, nausea, numbness, vomiting and weight loss. Additional information: last colonoscopy 6/15/2020, Labs 11/15-  Ferritin 9.00, Sat% 3, Iron Au 20, 11/15/24 Hbg 11.8, HCT 38.1,MCv 70, MCH 22, MCHC 31  Bm. 1 x daily.          Problem List  Problem Description Onset Date Chronic Clinical Status Notes   Generalized anxiety disorder 03/05/2012 Y    Contact allergy, Food allergies, Immunosuppression and Seasonal allergies.   Reproductive Negative Penile discharge and Sexual dysfunction.       Vital Signs   Height  Time ft in cm Last Measured Height Position   11:08 AM 5.0 4.00 162.56 05/23/2024 Standing     Weight/BSA/BMI  Time lb oz kg Context BMI kg/m2 BSA m2   11:08 AM 98.00  44.452 dressed with shoes 16.82      Date/Time Temp Pulse Resp BP SpO2 O2 Device O2 Flow Rate (L/min) Weight [facility]   11/27/24 1000 -- 76 19 135/70 96 % None (Room air) -- -- AS   11/27/24 0955 -- 77 19 140/73 Abnormal  96 % None (Room air) -- -- AS   11/27/24 0950 -- 77 23 139/72 97 % None (Room air) -- -- AS   11/27/24 0945 -- 76 -- 135/68 96 % None (Room air) -- -- AS   11/27/24 0940 -- 75 20 131/63 97 % None (Room air) -- -- AS   11/27/24 0935 -- 78 23 132/67 96 % None (Room air) -- -- AS   11/27/24 0930 -- 77 28 136/67 95 % None (Room air) -- -- AS   11/27/24 0927 -- 78 20 136/67 95 % None (Room air) -- -- AS   11/27/24 0833 97.8 °F (36.6 °C) 80 17 131/71 98 % None (Room air) --       Physical  Exam  Exam Findings Details   Constitutional Normal No acute distress. Well Nourished. Well developed.   Eyes Normal General - Right: Normal, Left: Normal. Conjunctiva - Right: Normal, Left: Normal. Sclera - Right: Normal, Left: Normal. Cornea - Right: Normal, Left: Normal. Pupil - Right: Normal, Left: Normal.   Nose/Mouth/Throat Normal Lips/teeth/gums - Normal. Tongue - Normal. Buccal mucosa - Normal. Palate & uvula - Normal.   Neck Exam Normal Inspection - Normal. Palpation - Normal. Thyroid gland - Normal. Cervical lymph nodes - Normal.   Cardiovascular Normal Heart rate - Regular rate. Heart sounds - Normal S1, Normal S2. Murmurs - None. Extremities - No edema.   Respiratory * Auscultation - Side: bilateral, Location: diffuse, Findings: decreased breath sounds.   Respiratory Normal Inspection - Normal. Percussion - Normal. Cough - Absent. Effort - Normal.   Abdomen Normal Patient is not

## 2024-11-27 NOTE — PROCEDURES
of procedure explained to the family the patient was taken to the endoscopy suite and placed in the left lateral decubitus position.  Following sequential administration of sedation as per above, the endoscope was inserted into the mouth and advanced under direct vision to third portion of the duodenum.  A careful inspection was made as the gastroscope was withdrawn, including a retroflexed view of the proximal stomach; findings and interventions are described below.      OROPHARYNX: The vocal Cords and the larynx are normal. Cricopharynx is slightly spastic.  ESOPHAGUS: There is diffuse esophageal dysmotility but no visible esophageal stenosis, esophagitis or Ochoa's. The proximal, mid, and distal oesophagus are normal. The Z-Line is minimally irregular. 2 to 3 cm Hiatal hernia. Diaphragmatic opening or notch is located at 38 cm.   STOMACH: No evidence of blood, fluid or solid food retention. The fundus on antegrade and retroflex views is normal.  except few small antral erythematous spots.The cardia, body, lesser curvature, greater curvature, the antrum, and pylorus are normal. The gastric mucosa is grossly normal. 5 gastric biopsies taken  DUODENUM: The bulb, second, third, fourth portions and major papilla are normal.  PROXIMAL JEJUNUM:  Not examined.  Therapies:  5 gastric biopsies    Specimen: one           Complications:   None    EBL:  Negligible.  IMPLANTS: * No implants in log *  IMPRESSION: Cricopharynx is slightly spastic.There is diffuse esophageal dysmotility but no visible esophageal stenosis, esophagitis or Ochoa's. The Z-Line is minimally irregular. 2 to 3 cm Hiatal hernia. Diaphragmatic opening or notch is located at 38 cm. No evidence of blood, fluid or solid food retention. The gastric mucosa is grossly normal except few small antral erythematous spots. 5 gastric biopsies taken. Nothing was found to explain iron deficiency anemia.          RECOMMENDATION:  May resume antireflux diet, chew  well food. Avoid dense meat. Avoid NSAID's. Make a FU appointment at the office. For now continue taking the Omeprazole 20 mg daily for 2 weeks and then as needed.  I think the next step is to do a CT scan of the abdomen and pelvis and if negative either repeat the colonoscopy of do a capsule enteroscopy. We need to monitor the CBC and iron profile    Assistant: None    --Lorraine Nixon MD on 11/27/2024 at 10:07 AM

## 2024-11-27 NOTE — DISCHARGE INSTRUCTIONS
Evans MATHEWS Maryanne  [id number]  1950    EGD DISCHARGE INSTRUCTIONS  Discomfort:  Sore throat- throat lozenges or warm salt water gargle  redness at IV site- apply warm compress to area; if redness or soreness persist- contact your physician  Gaseous discomfort- walking, belching will help relieve any discomfort  You may not operate a vehicle until the next day  You may not engage in an occupation involving machinery or appliances until the next day  You may not drink alcoholic beverages until the next day  Avoid making any critical decisions for at least 24 hour    DIET   You may not resume your regular diet. Antireflux diet.    ACTIVITY  You may not resume your normal daily activities   Spend the remainder of the day resting -  avoid any strenuous activity.    CALL M.D.  ANY SIGN OF   Increasing pain, nausea, vomiting  Abdominal distension (swelling)  New increased bleeding (oral or rectal)  Fever (chills)  Pain in chest area  Bloody discharge from nose or mouth  Shortness of breath     You may not take any Advil, Aspirin, Ibuprofen, Motrin, Aleve, or Goody’s ONLY  Tylenol as needed for pain.  Resume baby ASA tomorrow    Follow-up Instructions:   Follow-up in the office as scheduled or make a follow-up appointment in 2 weeks.     Lorraine Nixon MD  November 27, 2024        DISCHARGE SUMMARY from Nurse    PATIENT INSTRUCTIONS:    After general anesthesia or intravenous sedation, for 24 hours or while taking prescription Narcotics:  Limit your activities  Do not drive and operate hazardous machinery  Do not make important personal or business decisions  Do  not drink alcoholic beverages  If you have not urinated within 8 hours after discharge, please contact your surgeon on call.    Report the following to your surgeon:  Excessive pain, swelling, redness or odor of or around the surgical area  Temperature over 100.5  Nausea and vomiting lasting longer than 4 hours or if unable to take medications  Any signs

## 2024-11-27 NOTE — PRE SEDATION
Sedation Pre-Procedure Note    Patient Name: Evans Madden   YOB: 1950  Room/Bed: ENDO/PL  Medical Record Number: [id number]  Date: 11/27/2024   Time: 10:05 AM       Indication:  anemia    Consent: I have discussed with the patient and/or the patient representative the indication, alternatives, and the possible risks and/or complications of the planned procedure and the anesthesia methods. The patient and/or patient representative appear to understand and agree to proceed.    Vital Signs:   Vitals:    11/27/24 1000   BP: 135/70   Pulse: 76   Resp: 19   Temp:    SpO2: 96%       Past Medical History:   has a past medical history of Hypercholesterolemia, Hypertension, Other ill-defined conditions(799.89), and Stroke (HCC).    Past Surgical History:   has a past surgical history that includes orthopedic surgery (2010); Colonoscopy (N/A, 6/15/2020); Appendectomy; hernia repair; [place] tooth extraction; and lumbar fusion (N/A, 06/10/2024).    Medications:   Scheduled Meds:   Continuous Infusions:    sodium chloride 100 mL/hr at 11/27/24 0836     PRN Meds:   Home Meds:   Prior to Admission medications    Medication Sig Start Date End Date Taking? Authorizing Provider   lisinopril-hydroCHLOROthiazide (PRINZIDE;ZESTORETIC) 20-12.5 MG per tablet Take 1 tablet by mouth daily for high blood pressure 11/22/24  Yes Radha Tyler MD   omeprazole (PRILOSEC) 20 MG delayed release capsule Take 1 capsule by mouth Daily 11/26/24  Yes Radha Tyler MD   vitamin B-12 (CYANOCOBALAMIN) 100 MCG tablet Take 0.5 tablets by mouth every 30 days   Yes Radha Tyler MD   amLODIPine (NORVASC) 10 MG tablet Take 1 tablet by mouth every evening   Yes Automatic Reconciliation, Ar   rosuvastatin (CRESTOR) 10 MG tablet Take 1 tablet by mouth every evening   Yes Automatic Reconciliation, Ar   amoxicillin (AMOXIL) 500 MG capsule Take by mouth as needed (dental procedures) 10/22/24   Radha Tyler MD

## 2025-01-07 NOTE — PERIOP NOTES
Paged Dr. Lyndsey Cummings for a sign out. Thoracic Surgery Consultation    Patient: Galina Dyson                 Sex: female              MRN:  [id number]  Date: 01/06/25     HPI: Galina Dyson is a 57 year old female with a PMH of COPD, HTN, HLD, ruptured aneurysm s/p clipping, laryngeal cancer s/p radiation, hx of trach/PEG in past s/p decannulation who presented to the ED on 12/21/24 for generalized weakness, right facial droop, slurred speech. CTA showed a complete occlusion of the ICE at the bifurcation. Pt was also found to be hypoxic and required HFNC. It was suspected that the pt had aspiration PNA due to chronic dysphagia from her prior laryngeal cancer and radiation. She completed a course of abx, failed swallow studies and subsequently underwent an IR PEG placement on 12/31/24. The pt remained on HFNC with imaging showing basilar atelectasis. Pt underwent a bronchoscopy 1/6/25, which showed a small blue FB in the trachea below the vocal cords. ENT was consulted and performed laryngoscopy, but were unable to see the FB due to location below vocal cords. Thoracic Surgery was consulted for further evaluation.    Pt seen and examined at bedside. She is on NC, states she is feeling better, SOB is better. Denies any chest pain, nausea, vomiting, diarrhea, fever or chills. States she has had recurrent PNA in the past.      Review of Systems:   Review of Systems   Constitutional:  Negative for fever.   HENT:  Negative for congestion.    Eyes:  Negative for visual disturbance.   Respiratory:  Negative for cough and shortness of breath.    Cardiovascular:  Negative for chest pain.   Gastrointestinal:  Negative for abdominal pain, diarrhea, nausea and vomiting.   Genitourinary:  Negative for difficulty urinating and dysuria.   Musculoskeletal:  Negative for back pain and neck pain.   Neurological:  Negative for dizziness, weakness and headaches.        PMH:  Past Medical History:   Diagnosis Date    Cerebral infarction (CMD)     COPD (chronic obstructive  pulmonary disease)  (CMD)     CVA (cerebral vascular accident)  (CMD)     Essential (primary) hypertension     High cholesterol     Laryngeal cancer  (CMD)     Malignant neoplasm  (CMD)        PSH:  Past Surgical History:   Procedure Laterality Date    Back surgery      Brain surgery      Tracheostomy tube      s/p decannulation    Tube insertion         Home medications:  Current Facility-Administered Medications   Medication Dose Route Frequency Provider Last Rate Last Admin    [START ON 1/7/2025] aspirin chewable 324 mg  324 mg Per G Tube Daily Hiwot Tyler MD        ipratropium-albuterol (DUONEB) 0.5-2.5 (3) MG/3ML nebulizer solution 3 mL  3 mL Nebulization Q6H Resp Yasmin Gil, CNP   3 mL at 01/06/25 1455    HYDROcodone-acetaminophen (NORCO) 5-325 MG per tablet 1 tablet  1 tablet Per G Tube Q3H PRN Hiwot Tyler MD   1 tablet at 01/06/25 1701    piperacillin-tazobactam (ZOSYN) 3.375 g in sodium chloride 0.9 % 100 mL IVPB  3.375 g Intravenous 3 times per day Tiffany Zepeda MD 25 mL/hr at 01/06/25 1301 3.375 g at 01/06/25 1301    fluticasone (FLONASE) 50 MCG/ACT nasal spray 2 spray  2 spray Each Nare Daily Hiwot Tyler MD   2 spray at 01/06/25 0836    morphine injection 2 mg  2 mg Intravenous Q4H PRN Hiwot Tyler MD   2 mg at 01/05/25 1847    acetaminophen (TYLENOL) tablet 650 mg  650 mg Per G Tube Q4H PRN Hiwot Tyler MD   650 mg at 01/02/25 0149    Or    acetaminophen (TYLENOL) suppository 650 mg  650 mg Rectal Q4H PRN Hiwot Tyler MD        polyethylene glycol (MIRALAX) packet 17 g  17 g Per G Tube Daily Hiwot Tyler MD   17 g at 01/04/25 0824    docusate sodium-sennosides (SENOKOT S) 50-8.6 MG 2 tablet  2 tablet Per G Tube Daily PRN Hiwot Tyler MD   2 tablet at 01/04/25 0824    magnesium hydroxide (MILK OF MAGNESIA) 400 MG/5ML suspension 30 mL  30 mL Per G Tube Daily PRN Nayeli, Hiwot RUIZ MD        levothyroxine (SYNTHROID, LEVOTHROID) tablet 75 mcg  75 mcg Per G Tube QAM AC  Hiwot Tyler MD   75 mcg at 01/06/25 0634    [Held by provider] clopidogrel (PLAVIX) tablet 75 mg  75 mg Per G Tube Daily Hiwot Tyler MD        atorvastatin (LIPITOR) tablet 40 mg  40 mg Per G Tube Nightly Hiwot Tyler MD   40 mg at 01/05/25 2102    butalbital-APAP-caffeine (FIORICET) -40 MG tablet 1 tablet  1 tablet Per G Tube Q4H PRN Hiwot Tyler MD   1 tablet at 01/06/25 1432    hydrALAZINE (APRESOLINE) tablet 50 mg  50 mg Per G Tube Q8H PRN Hiwot Tyler MD        sertraline (ZOLOFT) tablet 50 mg  50 mg Per G Tube Daily Hiwot Tyler MD   50 mg at 01/05/25 0913    ALPRAZolam (XANAX) tablet 0.25 mg  0.25 mg Per G Tube Nightly Hiwot Tyler MD   0.25 mg at 01/05/25 2102    guaiFENesin-codeine (GUAIFENESIN AC) 100-10 MG/5ML liquid 5 mL  5 mL Per G Tube Q4H PRN Hiwot Tyler MD   5 mL at 01/06/25 1256    amLODIPine (NORVASC) tablet 10 mg  10 mg Per G Tube Daily Hiwot Tyler MD   10 mg at 01/06/25 0837    potassium CHLORIDE 20 mEq/100mL IVPB premix  20 mEq Intravenous Once Hiwot Tyler MD        Followed by    potassium CHLORIDE 20 mEq/100mL IVPB premix  20 mEq Intravenous Once Hiwot Tyler MD        albuterol inhaler 2 puff  2 puff Inhalation Q4H Resp PRN Mariza Wilks CNP   2 puff at 01/02/25 1325    umeclidinium-vilanterol (ANORO ELLIPTA) 62.5-25 MCG/ACT inhaler 1 puff  1 puff Inhalation Daily Hiwot Tyler MD   1 puff at 01/06/25 0836    heparin (porcine) injection 5,000 Units  5,000 Units Subcutaneous 3 times per day Priscilla Navarro CNP   5,000 Units at 01/06/25 0635    sodium chloride 0.9 % injection 10 mL  10 mL Intravenous PRN Una Jones MD   10 mL at 12/29/24 2013    Potassium Standard Replacement Protocol (Levels 3.5 and lower)   Does not apply See Admin Instructions Una Jones MD        Potassium Replacement (Levels 3.6 - 4)   Does not apply See Admin Instructions Una Jones MD        Magnesium Standard Replacement Protocol   Does not  apply See Admin Instructions Una Jones MD        Phosphorus Standard Replacement Protocol   Does not apply See Admin Instructions Una Jones MD        bisacodyl (DULCOLAX) suppository 10 mg  10 mg Rectal Daily PRN Una Jones MD            Allergies:  ALLERGIES:   Allergen Reactions    Pollen Other (See Comments)     congestion       Family Hx:  History reviewed. No pertinent family history.    Social Hx:  Social History     Tobacco Use    Smoking status: Former     Types: Cigarettes     Passive exposure: Never    Smokeless tobacco: Never   Substance Use Topics    Alcohol use: Not Currently    Drug use: Not Currently     Types: Prescription Drugs     Comment: In the past         Physical Exam:   Vital Last Value 24 Hour Range   Temperature 98.1 °F (36.7 °C) (01/06/25 1701) Temp  Min: 97.8 °F (36.6 °C)  Max: 98.2 °F (36.8 °C)   Pulse 73 (01/06/25 1800) Pulse  Min: 60  Max: 102   Respiratory 19 (01/06/25 1801) Resp  Min: 13  Max: 24   Non-Invasive  Blood Pressure 108/63 (01/06/25 1800) BP  Min: 82/58  Max: 125/60   Pulse Oximetry 97 % (01/06/25 1800) SpO2  Min: 92 %  Max: 100 %     Vital Today Admitted   Weight (!) 40 kg (88 lb 2.9 oz) (12/25/24 1543) Weight: 45.4 kg (100 lb) (12/21/24 1643)   Height N/A Height: 5' 6\" (167.6 cm) (12/21/24 1643)   BMI N/A BMI (Calculated): 16.14 (12/21/24 1643)     Gen: Alert, lying comfortably in no acute distress  Eyes: No scleral icterus  HEENT: Normocephalic, atraumatic  Neck: No lymphadenopathy  CV: Regular rate  Resp: Normal work of breathing on NC  GI: Abdomen is soft, non-distended, non-tender  MSK/Extremities: Moves all 4 extremities  Neuro: No focal deficits  Psych: Cooperative  Skin: Warm, dry    Labs:  Recent Labs   Lab 01/06/25  0224 01/05/25  0305 01/04/25 0223   WBC 9.9 13.4* 20.9*   HCT 32.0* 34.5* 35.9*   HGB 10.4* 10.6* 11.3*    423 447   CREATININE 0.53 0.44* 0.48*   POTASSIUM 3.8 3.7 3.8   MG 2.0 1.9  --    CALCIUM 8.9 8.7 8.9   GLUCOSE 111* 115*  131*   BUN 9 9 13   SODIUM 136 135 135   CHLORIDE 100 100 99   AST 36  --  33   GPT 47  --  51   BILIRUBIN 0.2  --  0.4   CO2 33* 31 32   ALBUMIN 2.1*  --  2.2*       Imaging:  XR CHEST AP OR PA   Final Result by Antony Toledo MD (01/06 8875)      Bronchoscopy w Bronchoalveolar Lavage   Final Result by Vishnu Munoz MD (01/06 1521)      CT CHEST WO CONTRAST   Final Result by Dilan Pugh MD (01/03 0800)      1.   Large amount of aspirated material in the right middle and bilateral   lower lobe bronchi with severe pulmonary consolidations and volume loss in   the right middle and bilateral lower lobes representing aspiration and/or   pneumonia.   2.   Small bilateral pleural effusions.   3.   Severe atelectasis.      Electronically Signed by: DILAN PUGH MD    Signed on: 1/3/2025 8:00 AM    Workstation ID: [id number]      XR CHEST AP OR PA   Final Result by Divya Richards MD (01/02 1909)   New large region of airspace consolidation at the right lower lung   consistent with pneumonia or aspiration. Small right effusion.                  Electronically Signed by: DIVYA RICHARDS MD    Signed on: 1/2/2025 7:09 PM    Workstation ID: [id number]      IR GASTROSTOMY TUBE INSERTION   Final Result by Elias Lowe DO (01/04 0330)      Successful placement of 16 [place] gastrostomy tube. The tube may be used   immediately for medications with water flushes. The patient will be kept   n.p.o. for 24 hours. After 24 hours, the gastrostomy tube may be used for   feeds.      Electronically Signed by: ELIAS LOWE DO    Signed on: 1/4/2025 3:30 AM    Workstation ID: [id number]      XR CHEST AP OR PA   Final Result by Antony Toledo MD (12/30 7428)   FINDINGS\IMPRESSION:   INTERVAL CHANGE SINCE PRIOR STUDY:         ADDITIONAL COMMENTS/OBSERVATIONS :   There is NO identifiable pneumothorax/pneumomediastinum/pneumoperitoneum   Improved aeration of the right lung base   Minimal residual  interstitial prominence at the right lung base   There is a degree of   hyperinflation as evidenced by somewhat flattened   /depressed diaphragms which can be seen in the setting of chronic   obstructive pulmonary disease      Interstitial reticular stranding in the upper lung fields and apical   pleural thickening concerning for parenchymal scarring   Pulmonary oligemia lung disorganized markings consistent with emphysematous   change         Electronically Signed by: REMA TOLEDO MD    Signed on: 12/30/2024 2:27 PM    Workstation ID: [id number]      CT ABDOMEN PELVIS WO CONTRAST   Final Result by Ramone Martines MD (12/30 0917)      1.   Right basilar airspace disease as detailed above, suspicious for   infection or aspiration. Small left pleural effusion.   2.   Cholelithiasis.   3.   Colonic diverticulosis. Additional findings above.               Electronically Signed by: RAMONE MARTINES MD    Signed on: 12/30/2024 9:17 AM    Workstation ID: [id number]      FL VIDEO SWALLOW   Final Result by Rema Toledo MD (12/27 8148)   FINDINGS/IMPRESSION:   Distorted laryngeal/hypopharyngeal anatomy. Patient has a history of   laryngeal cancer. History of radiation therapy to the region   Aneurysm clips project over the base of the brain and embolization coils   project over the brain as seen on the lateral view. History of aneurysms   Limited visualized cervical esophagus demonstrates no constricting or   obstructing or concerning mucosal abnormality.   Pharyngeal residual   Episodes of tracheal aspiration observed during the study   PLEASE SEE DETAILED SPEECH PATHOLOGY DEPARTMENT REPORT FOR RECOMMENDATIONS   AND QUANTIFICATION OF ASPIRATION            Electronically Signed by: REMA TOLEDO MD    Signed on: 12/27/2024 6:08 PM    Workstation ID: [id number]      XR CHEST AP OR PA   Final Result by Rema Toledo MD (12/26 1013)      XR CHEST AP OR PA   Final Result by Rema Toledo MD (12/24 1113)      XR  CHEST AP OR PA   Final Result by Natalie Blake MD (12/22 1200)   Impression:    Increased medium right pleural effusion with associated volume loss.      Electronically Signed by: NATALIE BLAKE MD    Signed on: 12/22/2024 12:00 PM    Workstation ID: [id number]      CTA CHEST PULMONARY EMBOLISM   Final Result by Divya Rodrigues MD (12/21 2037)   1.   No evidence of pulmonary embolism.   2.   Bilateral lower lobe consolidation and atelectasis which may represent   multilobar pneumonia versus aspiration.   3.   Small bilateral effusions.   4.   Severe centrilobular emphysema.      Electronically Signed by: DIVYA RODRIGUES MD    Signed on: 12/21/2024 8:37 PM    Workstation ID: [id number]      XR CHEST PA OR AP 1 VIEW   Final Result by Divya Rodrigues MD (12/21 1707)   Interstitial opacities bilaterally which may represent interstitial edema,   infection or underlying interstitial disease.                  Electronically Signed by: DIVYA RODRIGUES MD    Signed on: 12/21/2024 5:07 PM    Workstation ID: [id number]      CTA HEAD AND NECK LEVEL 1   Final Result by Alon Zapata DO (12/21 1701)      1. 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 There is   reconstitution of the distal ICA/MCA via the [place]. Findings may   be chronic given reconstitution however would be difficult to exclude an   acute on chronic process given lack of comparison imaging. The left M2/M3   arteries appear grossly patent however evaluation is somewhat limited due   to motion and streak artifact. MRI of the brain without contrast is   recommended for further characterization.      2. There is corresponding perfusional defect of the left cerebral   hemisphere although evaluation is limited secondary to extensive motion   artifact which may be overestimating the perfusional defect. No core   infarct is identified.           Dr. KENNY NELSON  was notified of the findings by Dr. Boyer 12/21/2024   4:58 PM.                  Electronically Signed by: LORIN BOYER DO    Signed on: 12/21/2024 5:01 PM    Workstation ID: [id number]      CT CEREBRAL PERFUSION W CONTRAST LEVEL 1   Final Result by Lorin Boyer DO (12/21 1701)      1. 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 There is   reconstitution of the distal ICA/MCA via the [place]. Findings may   be chronic given reconstitution however would be difficult to exclude an   acute on chronic process given lack of comparison imaging. The left M2/M3   arteries appear grossly patent however evaluation is somewhat limited due   to motion and streak artifact. MRI of the brain without contrast is   recommended for further characterization.      2. There is corresponding perfusional defect of the left cerebral   hemisphere although evaluation is limited secondary to extensive motion   artifact which may be overestimating the perfusional defect. No core   infarct is identified.          Dr. KENNY NELSON  was notified of the findings by Dr. Boyer 12/21/2024   4:58 PM.                  Electronically Signed by: LORIN BOYER DO    Signed on: 12/21/2024 5:01 PM    Workstation ID: [id number]      CT HEAD LEVEL 1   Final Result by Lorin Boyer DO (12/21 3798)      1. No evidence of acute intracranial hemorrhage.   2. Chronic infarct within the right JAY territory.      Dr. KENNY NELSON  was notified of the findings by Dr. Boyer 12/21/2024   4:27 PM.         Electronically Signed by: LORIN BOYER DO    Signed on: 12/21/2024 4:28 PM    Workstation ID: [id number]          Assessment:  Galina Dyson is a 57 year old female with a PMH of COPD, HTN, HLD, ruptured aneurysm s/p clipping, laryngeal cancer s/p radiation, hx of trach/PEG in past s/p decannulation who  presented to the ED on 12/21/24 for generalized weakness, right facial droop, slurred speech found to have an acute CVA. She was also hypoxic requiring HFNC due to suspected aspiration PNA. The pt remained on HFNC with imaging showing basilar atelectasis. Pt underwent a bronchoscopy 1/6/25, which showed a small blue FB in the trachea below the vocal cords. ENT was consulted and performed laryngoscopy, but were unable to see the FB due to location below vocal cords. Thoracic Surgery was consulted for further evaluation.    Plan:   -No acute surgical intervention indicated at this time. Suspect blue FB may be a prolene stitch from prior trach. No indication for emergent removal. Pt has follow up with her ENT provider at [facility]. Recommend she discuss this with her ENT provider on follow up.    Discussed with Dr. Romo.    Risa Townsend MD PGY-3  Thoracic Surgery

## (undated) DEVICE — ARM DRAPE

## (undated) DEVICE — ENDO CARRY-ON PROCEDURE KIT INCLUDES ENZYMATIC SPONGE, GAUZE, BIOHAZARD LABEL, TRAY, LUBRICANT, DIRTY SCOPE LABEL, WATER LABEL, TRAY, DRAWSTRING PAD, AND DEFENDO 4-PIECE KIT.: Brand: ENDO CARRY-ON PROCEDURE KIT

## (undated) DEVICE — GLOVE SURG SZ 65 THK91MIL LTX FREE SYN POLYISOPRENE

## (undated) DEVICE — INSUFFLATION NEEDLE TO ESTABLISH PNEUMOPERITONEUM.: Brand: INSUFFLATION NEEDLE

## (undated) DEVICE — SOLUTION IV 500ML 0.9% SOD CHL FLX CONT

## (undated) DEVICE — SOL IRRIGATION INJ NACL 0.9% 500ML BTL

## (undated) DEVICE — KIT EVAC 0.13IN RECT TB DIA10FR 400CC PVC 3 SPR Y CONN DRN

## (undated) DEVICE — NEEDLE HYPO 18GA L1.5IN PNK POLYPR HUB S STL REG BVL STR

## (undated) DEVICE — SYR 3ML LL TIP 1/10ML GRAD --

## (undated) DEVICE — SUTURE ABSORBABLE MONOFILAMENT 0 GS22 9 IN GRN V-LOC 180 VLOCL2246

## (undated) DEVICE — SUTURE DEV SZ 2-0 WND CLSR ABSRB GS-22 VLOC COVIDIEN VLOCM2145

## (undated) DEVICE — GARMENT,MEDLINE,DVT,INT,CALF,MED, GEN2: Brand: MEDLINE

## (undated) DEVICE — MAJ-1414 SINGLE USE ADPATER BIOPSY VALV: Brand: SINGLE USE ADAPTOR BIOPSY VALVE

## (undated) DEVICE — CATH SUC CTRL PRT TRIFLO 14FR --

## (undated) DEVICE — DRAPE XR C ARM 41X74IN LF --

## (undated) DEVICE — EJECTOR SALIVA 6 IN FLX CLR

## (undated) DEVICE — E-Z CLEAN, PTFE COATED, ELECTROSURGICAL LAPAROSCOPIC ELECTRODE, L-HOOK, 33 CM., SINGLE-USE, FOR USE WITH HAND CONTROL PENCIL: Brand: MEGADYNE

## (undated) DEVICE — DRESSING STERILE PETRO W3XL8IN N ADH OIL EMUL GZ CURAD

## (undated) DEVICE — ROUND DISSECTORS: Brand: DEROYAL

## (undated) DEVICE — SUTURE MCRYL SZ 4-0 L18IN ABSRB UD L19MM PS-2 3/8 CIR PRIM Y496G

## (undated) DEVICE — YANKAUER,FLEXIBLE HANDLE,REGLR CAPACITY: Brand: MEDLINE INDUSTRIES, INC.

## (undated) DEVICE — NDL PRT INJ NSAF BLNT 18GX1.5 --

## (undated) DEVICE — MEDI-VAC NON-CONDUCTIVE SUCTION TUBING: Brand: CARDINAL HEALTH

## (undated) DEVICE — VISUALIZATION SYSTEM: Brand: CLEARIFY

## (undated) DEVICE — Device

## (undated) DEVICE — TIP COVER ACCESSORY

## (undated) DEVICE — TRAP SPEC COLL POLYP POLYSTYR --

## (undated) DEVICE — 3.0MM PRECISION NEURO (MATCH HEAD)

## (undated) DEVICE — DERMABOND SKIN ADH 0.7ML --

## (undated) DEVICE — HANDPIECE SET WITH HIGH FLOW TIP AND SUCTION TUBE: Brand: INTERPULSE

## (undated) DEVICE — SOLUTION IV LACTATED RINGERS INJECTION USP

## (undated) DEVICE — SOLUTION IRRIG 500ML 0.9% SOD CHLO USP POUR PLAS BTL

## (undated) DEVICE — GLOVE ORANGE PI 8 1/2   MSG9085

## (undated) DEVICE — COLUMN DRAPE

## (undated) DEVICE — CATH IV SAFE STR 22GX1IN BLU -- PROTECTIV PLUS

## (undated) DEVICE — SYRINGE MED 50ML LUERSLIP TIP

## (undated) DEVICE — AIRSEAL 8 MM ACCESS PORT AND LOW PROFILE OBTURATOR WITH BLADELESS OPTICAL TIP, 120 MM LENGTH: Brand: AIRSEAL

## (undated) DEVICE — CONTAINER,SPECIMEN,O.R.STRL,4.5OZ: Brand: MEDLINE

## (undated) DEVICE — ELECTRODE PT RET AD L9FT HI MOIST COND ADH HYDRGEL CORDED

## (undated) DEVICE — PAD,ABDOMINAL,5"X9",ST,LF,25/BX: Brand: MEDLINE INDUSTRIES, INC.

## (undated) DEVICE — STERILE POLYISOPRENE POWDER-FREE SURGICAL GLOVES: Brand: PROTEXIS

## (undated) DEVICE — LAP CHOLE: Brand: MEDLINE INDUSTRIES, INC.

## (undated) DEVICE — PACK PROCEDURE SURG LAMINECTOMY SPINE CUST

## (undated) DEVICE — SPONGE GZ W4XL4IN RAYON POLY 4 PLY NONWOVEN FASTER WICKING

## (undated) DEVICE — WRISTBAND ID AD W2.5XL9.5CM RED VYN ADH CLSR UNI-PRINT

## (undated) DEVICE — KENDALL RADIOLUCENT FOAM MONITORING ELECTRODE RECTANGULAR SHAPE: Brand: KENDALL

## (undated) DEVICE — CATHETER IV 22GA L1IN BLU POLYUR STR HUB RADPQ PROTCT +

## (undated) DEVICE — GLOVE ORANGE PI 7   MSG9070

## (undated) DEVICE — SYRINGE 50ML E/T

## (undated) DEVICE — SYRINGE MED 50ML LUERLOCK TIP

## (undated) DEVICE — SHEET,DRAPE,70X100,STERILE: Brand: MEDLINE

## (undated) DEVICE — SYRINGE IRRIG 60ML SFT PLIABLE BLB EZ TO GRP 1 HND USE W/

## (undated) DEVICE — BLUNTFILL: Brand: MONOJECT

## (undated) DEVICE — 40595 XL TRENDELENBURG POSITIONING KIT: Brand: 40595 XL TRENDELENBURG POSITIONING KIT

## (undated) DEVICE — BLADELESS OBTURATOR: Brand: WECK VISTA

## (undated) DEVICE — 1LYRTR 16FR10ML 100%SILI SNAP: Brand: MEDLINE INDUSTRIES, INC.

## (undated) DEVICE — SINGLE PORT MANIFOLD: Brand: NEPTUNE 2

## (undated) DEVICE — TRNQT TEXT 1X18IN BLU LF DISP -- CONVERT TO ITEM 362165

## (undated) DEVICE — SEAL UNIV 5-8MM DISP BX/10 -- DA VINCI XI - SNGL USE

## (undated) DEVICE — SPONGE GZ W4XL4IN COT 12 PLY TYP VII WVN C FLD DSGN

## (undated) DEVICE — SYR 5ML 1/5 GRAD LL NSAF LF --

## (undated) DEVICE — MOUTHPIECE ENDOSCP L CTRL OPN AND SIDE PORTS DISP

## (undated) DEVICE — SHEET,DRAPE,40X58,STERILE: Brand: MEDLINE

## (undated) DEVICE — TOURNIQUET PHLEB W1XL18IN BLU FLAT RL AND BND REUSE FOR IV

## (undated) DEVICE — 3M™ TEGADERM™ TRANSPARENT FILM DRESSING FRAME STYLE, 1627, 4 IN X 10 IN (10 CM X 25 CM), 20/CT 4CT/CASE: Brand: 3M™ TEGADERM™

## (undated) DEVICE — CANNULA CUSH AD W/ 14FT TBG

## (undated) DEVICE — SOLUTION SURG PREP 26 CC PURPREP

## (undated) DEVICE — TRI-LUMEN FILTERED TUBE SET WITH ACTIVATED CHARCOAL FILTER: Brand: AIRSEAL

## (undated) DEVICE — SUTURE VICRYL + SZ 2-0 L18IN ABSRB VLT CT-2 1/2 CIR TAPERCUT VCP726D

## (undated) DEVICE — STERILE POLYISOPRENE POWDER-FREE SURGICAL GLOVES WITH EMOLLIENT COATING: Brand: PROTEXIS

## (undated) DEVICE — GLOVE ORANGE PI 8   MSG9080

## (undated) DEVICE — SUTURE ABSORBABLE BRAIDED 1-0 OS-8 CR 3X18 IN UD VICRYL J757T

## (undated) DEVICE — NDL FLTR TIP 5 MIC 18GX1.5IN --

## (undated) DEVICE — GAUZE,SPONGE,8"X4",12PLY,XRAY,STRL,LF: Brand: MEDLINE

## (undated) DEVICE — SYRINGE MEDICAL 3ML CLEAR PLASTIC STANDARD NON CONTROL LUERLOCK TIP DISPOSABLE

## (undated) DEVICE — FORCEPS BX CAP 240CM L RAD JAW 4

## (undated) DEVICE — SET ADMIN 16ML TBNG L100IN 2 Y INJ SITE IV PIGGY BK DISP